# Patient Record
Sex: MALE | Race: ASIAN | NOT HISPANIC OR LATINO | URBAN - METROPOLITAN AREA
[De-identification: names, ages, dates, MRNs, and addresses within clinical notes are randomized per-mention and may not be internally consistent; named-entity substitution may affect disease eponyms.]

---

## 2018-12-30 ENCOUNTER — EMERGENCY (EMERGENCY)
Facility: HOSPITAL | Age: 53
LOS: 1 days | Discharge: HOME | End: 2018-12-30
Attending: EMERGENCY MEDICINE
Payer: MEDICAID

## 2018-12-30 VITALS
RESPIRATION RATE: 17 BRPM | HEIGHT: 68 IN | HEART RATE: 77 BPM | WEIGHT: 205.91 LBS | DIASTOLIC BLOOD PRESSURE: 136 MMHG | OXYGEN SATURATION: 98 % | SYSTOLIC BLOOD PRESSURE: 206 MMHG | TEMPERATURE: 99 F

## 2018-12-30 LAB
ALBUMIN SERPL ELPH-MCNC: 4.2 G/DL — SIGNIFICANT CHANGE UP (ref 3.3–5)
ALP SERPL-CCNC: 110 U/L — SIGNIFICANT CHANGE UP (ref 40–120)
ALT FLD-CCNC: 33 U/L — SIGNIFICANT CHANGE UP (ref 10–45)
ANION GAP SERPL CALC-SCNC: 13 MMOL/L — SIGNIFICANT CHANGE UP (ref 5–17)
APPEARANCE UR: CLEAR — SIGNIFICANT CHANGE UP
APTT BLD: 32.1 SEC — SIGNIFICANT CHANGE UP (ref 27.5–36.3)
AST SERPL-CCNC: 33 U/L — SIGNIFICANT CHANGE UP (ref 10–40)
BILIRUB SERPL-MCNC: 0.7 MG/DL — SIGNIFICANT CHANGE UP (ref 0.2–1.2)
BILIRUB UR-MCNC: NEGATIVE — SIGNIFICANT CHANGE UP
BUN SERPL-MCNC: 14 MG/DL — SIGNIFICANT CHANGE UP (ref 7–23)
CALCIUM SERPL-MCNC: 9.7 MG/DL — SIGNIFICANT CHANGE UP (ref 8.4–10.5)
CHLORIDE SERPL-SCNC: 101 MMOL/L — SIGNIFICANT CHANGE UP (ref 96–108)
CO2 SERPL-SCNC: 25 MMOL/L — SIGNIFICANT CHANGE UP (ref 22–31)
COLOR SPEC: SIGNIFICANT CHANGE UP
CREAT SERPL-MCNC: 0.97 MG/DL — SIGNIFICANT CHANGE UP (ref 0.5–1.3)
DIFF PNL FLD: NEGATIVE — SIGNIFICANT CHANGE UP
GLUCOSE SERPL-MCNC: 188 MG/DL — HIGH (ref 70–99)
GLUCOSE UR QL: NEGATIVE — SIGNIFICANT CHANGE UP
HCT VFR BLD CALC: 43.5 % — SIGNIFICANT CHANGE UP (ref 39–50)
HGB BLD-MCNC: 14.3 G/DL — SIGNIFICANT CHANGE UP (ref 13–17)
INR BLD: 1 RATIO — SIGNIFICANT CHANGE UP (ref 0.88–1.16)
KETONES UR-MCNC: NEGATIVE — SIGNIFICANT CHANGE UP
LEUKOCYTE ESTERASE UR-ACNC: NEGATIVE — SIGNIFICANT CHANGE UP
MCHC RBC-ENTMCNC: 28.1 PG — SIGNIFICANT CHANGE UP (ref 27–34)
MCHC RBC-ENTMCNC: 32.9 GM/DL — SIGNIFICANT CHANGE UP (ref 32–36)
MCV RBC AUTO: 85.1 FL — SIGNIFICANT CHANGE UP (ref 80–100)
NITRITE UR-MCNC: NEGATIVE — SIGNIFICANT CHANGE UP
PH UR: 6.5 — SIGNIFICANT CHANGE UP (ref 5–8)
PLATELET # BLD AUTO: 291 K/UL — SIGNIFICANT CHANGE UP (ref 150–400)
POTASSIUM SERPL-MCNC: 3.6 MMOL/L — SIGNIFICANT CHANGE UP (ref 3.5–5.3)
POTASSIUM SERPL-SCNC: 3.6 MMOL/L — SIGNIFICANT CHANGE UP (ref 3.5–5.3)
PROT SERPL-MCNC: 8.2 G/DL — SIGNIFICANT CHANGE UP (ref 6–8.3)
PROT UR-MCNC: NEGATIVE — SIGNIFICANT CHANGE UP
PROTHROM AB SERPL-ACNC: 11.4 SEC — SIGNIFICANT CHANGE UP (ref 10–12.9)
RBC # BLD: 5.11 M/UL — SIGNIFICANT CHANGE UP (ref 4.2–5.8)
RBC # FLD: 12.7 % — SIGNIFICANT CHANGE UP (ref 10.3–14.5)
SODIUM SERPL-SCNC: 139 MMOL/L — SIGNIFICANT CHANGE UP (ref 135–145)
SP GR SPEC: >1.05 (ref 1.01–1.02)
TROPONIN T, HIGH SENSITIVITY RESULT: 7 NG/L — SIGNIFICANT CHANGE UP (ref 0–51)
TROPONIN T, HIGH SENSITIVITY RESULT: <6 NG/L — SIGNIFICANT CHANGE UP (ref 0–51)
UROBILINOGEN FLD QL: NEGATIVE — SIGNIFICANT CHANGE UP
WBC # BLD: 9.2 K/UL — SIGNIFICANT CHANGE UP (ref 3.8–10.5)
WBC # FLD AUTO: 9.2 K/UL — SIGNIFICANT CHANGE UP (ref 3.8–10.5)

## 2018-12-30 PROCEDURE — 71045 X-RAY EXAM CHEST 1 VIEW: CPT | Mod: 26

## 2018-12-30 PROCEDURE — 93010 ELECTROCARDIOGRAM REPORT: CPT

## 2018-12-30 PROCEDURE — 99218: CPT

## 2018-12-30 PROCEDURE — 74174 CTA ABD&PLVS W/CONTRAST: CPT | Mod: 26

## 2018-12-30 PROCEDURE — 71275 CT ANGIOGRAPHY CHEST: CPT | Mod: 26

## 2018-12-30 RX ORDER — METOPROLOL TARTRATE 50 MG
5 TABLET ORAL ONCE
Qty: 0 | Refills: 0 | Status: DISCONTINUED | OUTPATIENT
Start: 2018-12-30 | End: 2018-12-30

## 2018-12-30 RX ORDER — METOPROLOL TARTRATE 50 MG
5 TABLET ORAL ONCE
Qty: 0 | Refills: 0 | Status: COMPLETED | OUTPATIENT
Start: 2018-12-30 | End: 2018-12-30

## 2018-12-30 RX ORDER — CAPTOPRIL 12.5 MG/1
25 TABLET ORAL DAILY
Qty: 0 | Refills: 0 | Status: DISCONTINUED | OUTPATIENT
Start: 2018-12-30 | End: 2019-01-03

## 2018-12-30 RX ORDER — ASPIRIN/CALCIUM CARB/MAGNESIUM 324 MG
324 TABLET ORAL ONCE
Qty: 0 | Refills: 0 | Status: COMPLETED | OUTPATIENT
Start: 2018-12-30 | End: 2018-12-30

## 2018-12-30 RX ORDER — AMLODIPINE BESYLATE 2.5 MG/1
10 TABLET ORAL ONCE
Qty: 0 | Refills: 0 | Status: COMPLETED | OUTPATIENT
Start: 2018-12-30 | End: 2018-12-30

## 2018-12-30 RX ORDER — METOPROLOL TARTRATE 50 MG
50 TABLET ORAL ONCE
Qty: 0 | Refills: 0 | Status: COMPLETED | OUTPATIENT
Start: 2018-12-30 | End: 2018-12-30

## 2018-12-30 RX ADMIN — Medication 324 MILLIGRAM(S): at 17:08

## 2018-12-30 RX ADMIN — Medication 50 MILLIGRAM(S): at 17:08

## 2018-12-30 RX ADMIN — AMLODIPINE BESYLATE 10 MILLIGRAM(S): 2.5 TABLET ORAL at 17:08

## 2018-12-30 RX ADMIN — Medication 5 MILLIGRAM(S): at 12:42

## 2018-12-30 NOTE — ED ADULT TRIAGE NOTE - CHIEF COMPLAINT QUOTE
chest pain starting last night while traveling on plane. pain is constant, dull located midsternal chest and radiates to the back. PMH HTN. denies dizziness, N/V, numbness or tingling chest pain starting last night while traveling on plane. pain is constant, dull located midsternal chest and radiates to the back with HTN. PMH HTN. denies dizziness, N/V, numbness or tingling

## 2018-12-30 NOTE — ED PROVIDER NOTE - OBJECTIVE STATEMENT
52yo male pt with PMHx of HTN (unable to recall the name of med) c/o left chest pressure radiating to left arm and back since last night. Pt stated he felt the pain the middle of traveling in airplane (4hours ) last night 52yo male pt with PMHx of HTN (unable to recall the name of med) c/o left chest pressure radiating to left arm and back since last night. Pt stated he felt the pain the middle of traveling in airplane last night and it is constant since then. Denies headache, dizziness or sweating. Denies fever, chills or cough/ congestion. Denies SOB/ABD pain or N/V/D. Denies sensory changes or weakness to extremities. No prev. stress test or cardiologist f/u. 52yo male pt with PMHx of HTN (unable to recall the name of med) c/o left chest pressure radiating to left arm and back since last night. Pt stated he felt the pain the middle of traveling (from [facility]) in airplane last night and it is constant since then. Denies headache, dizziness or sweating. Denies fever, chills or cough/ congestion. Denies SOB/ABD pain or N/V/D. Denies sensory changes or weakness to extremities. No prev. stress test or cardiologist f/u.

## 2018-12-30 NOTE — ED CDU PROVIDER INITIAL DAY NOTE - OBJECTIVE STATEMENT
52yo male pt with PMHx of HTN on captopril c/o left chest pressure radiating to left arm and back since last night. Pt stated he felt the pain the middle of traveling (from [facility]) in airplane last night and it is constant but improving since then. Denies headache, dizziness or sweating. Denies fever, chills or cough/ congestion. Denies SOB/ABD pain or N/V/D. Denies sensory changes or weakness to extremities. No prev. stress test or cardiologist f/u. patient denies personal hx of CAD. on ASA 81 mg daily.

## 2018-12-30 NOTE — ED PROVIDER NOTE - MEDICAL DECISION MAKING DETAILS
Sagrario: 53 year old male with left chest pressure to left arm. will get labs, cxr, cta disseiton study, cdu for acs workup.

## 2018-12-30 NOTE — ED ADULT NURSE NOTE - NS ED NURSE DC INFO COMPLEXITY
Returned Demonstration/Patient asked questions/Simple: Patient demonstrates quick and easy understanding/Moderate: Comprehensive teaching/Verbalized Understanding

## 2018-12-30 NOTE — ED ADULT NURSE REASSESSMENT NOTE - NS ED NURSE REASSESS COMMENT FT1
Received pt from JESUS Pastrana, received pt alert and responsive, oriented x4, denies any respiratory distress, SOB, or difficulty breathing. Pt transferred to CDU for L sided chest pain, c/o 3/10 L sided chest pain declined pain medication. Pt denies SOB, diaphoresis, dizziness, lightheadedness, N/V, F/C, palpitations. On telemetry pt is SR hr: 60's.  IV in place, patent and free of signs of infiltration, V/S stable, pt afebrile. Pt educated on unit and unit rules, instructed patient to notify RN of any needed assistance, Pt verbalizes understanding, Call bell placed within reach. Safety maintained. Will continue to monitor. PT pending stress test, aware not to consume caffeine prior to test. Spouse at bedside. Received pt from JESUS Pastrana, received pt alert and responsive, oriented x4, denies any respiratory distress, SOB, or difficulty breathing. Pt transferred to CDU for L sided chest pain, c/o 3/10 L sided chest pain declined pain medication, PA made aware.  Pt denies SOB, diaphoresis, dizziness, lightheadedness, N/V, F/C, palpitations. On telemetry pt is SR hr: 60's.  IV in place, patent and free of signs of infiltration, V/S stable, pt afebrile. Pt educated on unit and unit rules, instructed patient to notify RN of any needed assistance, Pt verbalizes understanding, Call bell placed within reach. Safety maintained. Will continue to monitor. PT pending stress test, aware not to consume caffeine prior to test. Spouse at bedside.

## 2018-12-30 NOTE — ED CDU PROVIDER INITIAL DAY NOTE - MEDICAL DECISION MAKING DETAILS
Sagrario: 53 year old male with chest pain radiating to the arm. will c/w tele monitoring, stress test, reassess.
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
## 2018-12-30 NOTE — ED ADULT NURSE NOTE - CHIEF COMPLAINT QUOTE
chest pain starting last night while traveling on plane. pain is constant, dull located midsternal chest and radiates to the back with HTN. PMH HTN. denies dizziness, N/V, numbness or tingling

## 2018-12-30 NOTE — ED CDU PROVIDER INITIAL DAY NOTE - PROGRESS NOTE DETAILS
CDU NOTE AG MARSH: NAD VSS.  Patient resting comfortably and has no current complaints. no events on tele. awaiting second troponin as it had to go to the core lab. ekg with no acute changes. CDU PROGRESS NOTE AG DELEON: Pt resting comfortably, feeling well without complaint. NAD, VSS. No events on telemetry. c/d/w Cardiology, will continue with plan exercise stress test and monitor on telemetry. CDU PROGRESS NOTE AG DELEON: Received pt at 1900 sign-out. Pt resting in stretcher in NAD. Case/plan reviewed. VSS, /90. Pt aox3, ambulatory around unit with steady gait. S1 S2 noted, RRR, lungs CTA b/l, BS x4 with soft, nontender abdomen. Pt reports having 3/10 chest discomfort. No events on telemetry. Will discuss with cardiology and continue to monitor overnight. CDU PROGRESS NOTE PA ADRIENNE: Pt resting comfortably, feeling well, reports having unchanged discomfort 3/10 anterior chest, decline pain medication. NAD, VSS. No events on telemetry. CDU PROGRESS NOTE AG DELEON: Received pt at 1900 sign-out. Pt resting in stretcher in NAD. Case/plan reviewed. VSS, /90. Pt aox3, ambulatory around unit with steady gait. S1 S2 noted, RRR, lungs CTA b/l, BS x4 with soft, nontender abdomen. Pt reports having 3/10 chest discomfort, declines pain medication. No events on telemetry. Will discuss with cardiology and continue to monitor overnight. CDU PROGRESS NOTE AG DELEON: Pt resting comfortably, NAD, VSS. No events on telemetry. c/d/w Cardiology, will continue with plan exercise stress test and monitor on telemetry.

## 2018-12-31 VITALS
SYSTOLIC BLOOD PRESSURE: 161 MMHG | DIASTOLIC BLOOD PRESSURE: 107 MMHG | RESPIRATION RATE: 18 BRPM | OXYGEN SATURATION: 98 % | HEART RATE: 68 BPM | TEMPERATURE: 99 F

## 2018-12-31 LAB
CHOLEST SERPL-MCNC: 234 MG/DL — HIGH (ref 10–199)
HBA1C BLD-MCNC: 8.2 % — HIGH (ref 4–5.6)
HDLC SERPL-MCNC: 24 MG/DL — LOW
LIPID PNL WITH DIRECT LDL SERPL: 172 MG/DL — HIGH
TOTAL CHOLESTEROL/HDL RATIO MEASUREMENT: 9.8 RATIO — HIGH (ref 3.4–9.6)
TRIGL SERPL-MCNC: 189 MG/DL — HIGH (ref 10–149)
TROPONIN T, HIGH SENSITIVITY RESULT: 13 NG/L — SIGNIFICANT CHANGE UP (ref 0–51)

## 2018-12-31 PROCEDURE — 83036 HEMOGLOBIN GLYCOSYLATED A1C: CPT

## 2018-12-31 PROCEDURE — 74174 CTA ABD&PLVS W/CONTRAST: CPT

## 2018-12-31 PROCEDURE — 96374 THER/PROPH/DIAG INJ IV PUSH: CPT | Mod: XU

## 2018-12-31 PROCEDURE — 85027 COMPLETE CBC AUTOMATED: CPT

## 2018-12-31 PROCEDURE — 99217: CPT

## 2018-12-31 PROCEDURE — 84484 ASSAY OF TROPONIN QUANT: CPT

## 2018-12-31 PROCEDURE — 93005 ELECTROCARDIOGRAM TRACING: CPT | Mod: 76

## 2018-12-31 PROCEDURE — 81003 URINALYSIS AUTO W/O SCOPE: CPT

## 2018-12-31 PROCEDURE — 80061 LIPID PANEL: CPT

## 2018-12-31 PROCEDURE — 85379 FIBRIN DEGRADATION QUANT: CPT

## 2018-12-31 PROCEDURE — 93018 CV STRESS TEST I&R ONLY: CPT

## 2018-12-31 PROCEDURE — 71045 X-RAY EXAM CHEST 1 VIEW: CPT

## 2018-12-31 PROCEDURE — 85610 PROTHROMBIN TIME: CPT

## 2018-12-31 PROCEDURE — 80053 COMPREHEN METABOLIC PANEL: CPT

## 2018-12-31 PROCEDURE — A9500: CPT

## 2018-12-31 PROCEDURE — 93017 CV STRESS TEST TRACING ONLY: CPT

## 2018-12-31 PROCEDURE — 78452 HT MUSCLE IMAGE SPECT MULT: CPT

## 2018-12-31 PROCEDURE — 71275 CT ANGIOGRAPHY CHEST: CPT

## 2018-12-31 PROCEDURE — 99284 EMERGENCY DEPT VISIT MOD MDM: CPT | Mod: 25

## 2018-12-31 PROCEDURE — 78452 HT MUSCLE IMAGE SPECT MULT: CPT | Mod: 26

## 2018-12-31 PROCEDURE — G0378: CPT

## 2018-12-31 PROCEDURE — 93016 CV STRESS TEST SUPVJ ONLY: CPT

## 2018-12-31 PROCEDURE — 85730 THROMBOPLASTIN TIME PARTIAL: CPT

## 2018-12-31 RX ORDER — ACETAMINOPHEN 500 MG
975 TABLET ORAL ONCE
Qty: 0 | Refills: 0 | Status: COMPLETED | OUTPATIENT
Start: 2018-12-31 | End: 2018-12-31

## 2018-12-31 RX ORDER — AMLODIPINE BESYLATE 2.5 MG/1
1 TABLET ORAL
Qty: 7 | Refills: 0 | OUTPATIENT
Start: 2018-12-31 | End: 2019-01-06

## 2018-12-31 RX ORDER — AMLODIPINE BESYLATE 2.5 MG/1
10 TABLET ORAL ONCE
Qty: 0 | Refills: 0 | Status: COMPLETED | OUTPATIENT
Start: 2018-12-31 | End: 2018-12-31

## 2018-12-31 RX ADMIN — Medication 975 MILLIGRAM(S): at 11:03

## 2018-12-31 RX ADMIN — CAPTOPRIL 25 MILLIGRAM(S): 12.5 TABLET ORAL at 05:04

## 2018-12-31 RX ADMIN — AMLODIPINE BESYLATE 10 MILLIGRAM(S): 2.5 TABLET ORAL at 16:47

## 2018-12-31 NOTE — ED CDU PROVIDER DISPOSITION NOTE - PLAN OF CARE
1. Follow up with your PMD within 48-72 hours.   You may schedule appointment this week with Cardiology clinic by calling [phone].  2. Show copies of your reports given to you. Recommend Aspirin 81mg over the counter daily until further evaluation.  Take all of your other medications as previously prescribed.   3. Worsening or continued chest pain, shortness of breath, weakness, return to ED. 1. Stay hydrated. Encourage low carb/sugar/salt diet. Encourage exercise.   2. Continue Current Home Medications  3. Follow up with your PCP or Medicine clinic [phone] in 1-2 days and Cardiology #[phone] in 2-3 days. Please follow up elevated blood pressures. Please also follow up high HgbA1c (elevated sugars) and high cholesterol. (Bring printed results to your doctor visit).  4. Return if symptoms, worsen, fever, weakness, chest pain, difficulty breathing, dizziness and all other concerns.

## 2018-12-31 NOTE — ED CDU PROVIDER SUBSEQUENT DAY NOTE - PROGRESS NOTE DETAILS
CDU PROGRESS NOTE AG DELEON: Pt resting comfortably, feeling well, reports 1/10 anterior chest pain, non exertional. NAD, /97, No events on telemetry. CDU NOTE AG Ramírez: pt resting comfortably, feels well without complaint. cp improved. NAD recent VSS. no events on tele. CDU NOTE AG Ramírez: pt resting comfortably, feels well without complaint. NAD VSS. no events on tele. CDU NOTE AG Ramírez: pt resting comfortably, feels well without complaint. NAD VSS. no events on tele.  stress test- no areas of ischemia.   discussed with Dr. Crane, pt stable for d/c tof /up with Cardiology within 2-3 days. pt will get rapid f/up sheet. pt feeling better.  asymptomatic.  pt had chest pain during NucStressTest briefly, but resolved during recovery and reading "probably normal"  pt with cont elevation BP and elevated HA1C - pt to be given RC for BP and advised to f/u with cardiology and IM clinic within the next week.  Pt stable for D/C.

## 2018-12-31 NOTE — ED ADULT NURSE REASSESSMENT NOTE - COMFORT CARE
po fluids offered/repositioned/plan of care explained/darkened lights/warm blanket provided
ambulated to bathroom/plan of care explained/po fluids offered

## 2018-12-31 NOTE — ED CDU PROVIDER DISPOSITION NOTE - NSFOLLOWUPINSTRUCTIONS_ED_ALL_ED_FT
1. Stay hydrated. Encourage low carb/sugar/salt diet. Encourage exercise.   2. Continue Current Home Medications  3. Follow up with your PCP or Medicine clinic [phone] in 1-2 days and Cardiology #[phone] in 2-3 days. Please follow up elevated blood pressures. Please also follow up high HgbA1c (elevated sugars) and high cholesterol. (Bring printed results to your doctor visit).  4. Return if symptoms, worsen, fever, weakness, chest pain, difficulty breathing, dizziness and all other concerns. 1. Stay hydrated. Encourage low carb/sugar/salt diet. Encourage exercise.   2. Continue Current Home Medications. Start Amlodipine 10mg daily. Start Aspirin 81mg daily.   3. Follow up with your PCP or Medicine clinic [phone] in 1-2 days and Cardiology #[phone] in 2-3 days. Please follow up elevated blood pressures. Please also follow up high HgbA1c (elevated sugars) and high cholesterol. (Bring printed results to your doctor visit).  4. Return if symptoms, worsen, fever, weakness, chest pain, difficulty breathing, dizziness and all other concerns.

## 2018-12-31 NOTE — ED CDU PROVIDER SUBSEQUENT DAY NOTE - HISTORY
CDU PROGRESS NOTE PA ADRIENNE: No interval change from previous note. Pt resting comfortably, feeling well, NAD, VSS. No events on telemetry.

## 2018-12-31 NOTE — ED ADULT NURSE REASSESSMENT NOTE - GENERAL PATIENT STATE
comfortable appearance/resting/sleeping/cooperative/family/SO at bedside/improvement verbalized/smiling/interactive
comfortable appearance

## 2018-12-31 NOTE — ED CDU PROVIDER DISPOSITION NOTE - ATTENDING CONTRIBUTION TO CARE
Chest pain and elevated BP.  CTA neg for dissection.  NUC STRESS test, cardiac monitor and reassess.

## 2018-12-31 NOTE — ED ADULT NURSE REASSESSMENT NOTE - NS ED NURSE REASSESS COMMENT FT1
07.00 AM Received pt from JESUS YUSUF  Pt is observed  for Chest pain   07.45 Am  pt reassessed , received pt alert and responsive, oriented x4, denies any respiratory distress, SOB, or difficulty breathing, SOB, diaphoresis, dizziness, lightheadedness, N/V, F/C, palpitations. On telemetry pt is SR hr: 60's.  IV in place, patent and free of signs of infiltration, V/S stable, pt afebrile. Pt educated on unit and unit rules, instructed patient to notify RN of any needed assistance, Pt verbalizes understanding, Call bell placed within reach. Safety maintained. Will continue to monitor. PT pending stress test, aware not to consume caffeine prior to test. Spouse at bedside. C/O headache medicated as per order   09.00 Am Pt went for stress test

## 2018-12-31 NOTE — ED CDU PROVIDER DISPOSITION NOTE - CLINICAL COURSE
52yo male pt with PMHx of HTN on captopril c/o left chest pressure radiating to left arm and back since last night. Pt stated he felt the pain the middle of traveling (from [facility]) in airplane last night and it is constant but improving since then. Denies headache, dizziness or sweating. Denies fever, chills or cough/ congestion. Denies SOB/ABD pain or N/V/D. Denies sensory changes or weakness to extremities. No prev. stress test or cardiologist f/u. patient denies personal hx of CAD. on ASA 81 mg daily.  In ED, Patient had ekg showing no signs of acute ischemia, troponin x 2 negative, chest x ray no signs of acute pathology. patient sent to CDU for frequent reeval, vitals q 4hrs, telemetry, Nuclear stress test. 54yo male pt with PMHx of HTN on captopril c/o left chest pressure radiating to left arm and back since last night. Pt stated he felt the pain the middle of traveling (from [facility]) in airplane last night and it is constant but improving since then. Denies headache, dizziness or sweating. Denies fever, chills or cough/ congestion. Denies SOB/ABD pain or N/V/D. Denies sensory changes or weakness to extremities. No prev. stress test or cardiologist f/u. patient denies personal hx of CAD. on ASA 81 mg daily.  In ED, Patient had ekg showing no signs of acute ischemia, troponin x 2 negative, chest x ray no signs of acute pathology. patient sent to CDU for frequent reeval, vitals q 4hrs, telemetry, Nuclear stress test.   nuc stress test- probably normal. no areas of ischemia.   pt to f/up outpt with cardiology.

## 2018-12-31 NOTE — ED ADULT NURSE REASSESSMENT NOTE - NS ED NURSE REASSESS COMMENT FT1
16.30  Pt feeling better . Pt was reviewed by AG Ramírez & Dr solorzano with all the results  Pt is discharged  ML out  AG Dean explained the follow up care  & gave the discharge summary  Pt verbalized the understanding on follow up care .  pt stable to go home pt has stable vitals steady gait A&OX 4  afebrile at the time of Discharge

## 2018-12-31 NOTE — ED ADULT NURSE REASSESSMENT NOTE - NSIMPLEMENTINTERV_GEN_ALL_ED
Implemented All Universal Safety Interventions:  [place] to call system. Call bell, personal items and telephone within reach. Instruct patient to call for assistance. Room bathroom lighting operational. Non-slip footwear when patient is off stretcher. Physically safe environment: no spills, clutter or unnecessary equipment. Stretcher in lowest position, wheels locked, appropriate side rails in place.

## 2019-01-03 ENCOUNTER — OUTPATIENT (OUTPATIENT)
Dept: OUTPATIENT SERVICES | Facility: HOSPITAL | Age: 54
LOS: 1 days | End: 2019-01-03
Payer: SELF-PAY

## 2019-01-03 ENCOUNTER — APPOINTMENT (OUTPATIENT)
Dept: CARDIOLOGY | Facility: HOSPITAL | Age: 54
End: 2019-01-03

## 2019-01-03 ENCOUNTER — NON-APPOINTMENT (OUTPATIENT)
Age: 54
End: 2019-01-03

## 2019-01-03 VITALS
WEIGHT: 220 LBS | DIASTOLIC BLOOD PRESSURE: 82 MMHG | HEIGHT: 70 IN | OXYGEN SATURATION: 98 % | HEART RATE: 109 BPM | BODY MASS INDEX: 31.5 KG/M2 | SYSTOLIC BLOOD PRESSURE: 129 MMHG

## 2019-01-03 DIAGNOSIS — I10 ESSENTIAL (PRIMARY) HYPERTENSION: ICD-10-CM

## 2019-01-03 DIAGNOSIS — E11.9 TYPE 2 DIABETES MELLITUS W/OUT COMPLICATIONS: ICD-10-CM

## 2019-01-03 DIAGNOSIS — E66.3 OVERWEIGHT: ICD-10-CM

## 2019-01-03 DIAGNOSIS — E78.5 HYPERLIPIDEMIA, UNSPECIFIED: ICD-10-CM

## 2019-01-03 DIAGNOSIS — I25.10 ATHEROSCLEROTIC HEART DISEASE OF NATIVE CORONARY ARTERY WITHOUT ANGINA PECTORIS: ICD-10-CM

## 2019-01-03 PROBLEM — Z00.00 ENCOUNTER FOR PREVENTIVE HEALTH EXAMINATION: Status: ACTIVE | Noted: 2019-01-03

## 2019-01-03 PROCEDURE — 93005 ELECTROCARDIOGRAM TRACING: CPT

## 2019-01-03 PROCEDURE — G0463: CPT

## 2019-01-03 RX ORDER — ASPIRIN ENTERIC COATED TABLETS 81 MG 81 MG/1
81 TABLET, DELAYED RELEASE ORAL DAILY
Qty: 30 | Refills: 0 | Status: ACTIVE | COMMUNITY
Start: 2019-01-03 | End: 1900-01-01

## 2019-01-03 RX ORDER — AMLODIPINE BESYLATE 10 MG/1
10 TABLET ORAL DAILY
Qty: 30 | Refills: 0 | Status: ACTIVE | COMMUNITY
Start: 2019-01-03 | End: 1900-01-01

## 2019-01-03 RX ORDER — METFORMIN HYDROCHLORIDE 500 MG/1
500 TABLET, COATED ORAL TWICE DAILY
Qty: 60 | Refills: 0 | Status: ACTIVE | COMMUNITY
Start: 2019-01-03 | End: 1900-01-01

## 2019-01-03 RX ORDER — ATORVASTATIN CALCIUM 40 MG/1
40 TABLET, FILM COATED ORAL DAILY
Qty: 30 | Refills: 0 | Status: ACTIVE | COMMUNITY
Start: 2019-01-03 | End: 1900-01-01

## 2019-01-03 NOTE — PHYSICAL EXAM
[General Appearance - Well Developed] : well developed [Normal Appearance] : normal appearance [General Appearance - Well Nourished] : well nourished [Normal Oral Mucosa] : normal oral mucosa [No Oral Pallor] : no oral pallor [Normal Jugular Venous A Waves Present] : normal jugular venous A waves present [Normal Jugular Venous V Waves Present] : normal jugular venous V waves present [Heart Rate And Rhythm] : heart rate and rhythm were normal [Heart Sounds] : normal S1 and S2 [Murmurs] : no murmurs present [Arterial Pulses Normal] : the arterial pulses were normal [Edema] : no peripheral edema present [] : no respiratory distress [Respiration, Rhythm And Depth] : normal respiratory rhythm and effort [Exaggerated Use Of Accessory Muscles For Inspiration] : no accessory muscle use [Auscultation Breath Sounds / Voice Sounds] : lungs were clear to auscultation bilaterally [Bowel Sounds] : normal bowel sounds [Abdomen Soft] : soft [Abdomen Tenderness] : non-tender [Abnormal Walk] : normal gait [Oriented To Time, Place, And Person] : oriented to person, place, and time [Impaired Insight] : insight and judgment were intact

## 2019-01-03 NOTE — DISCUSSION/SUMMARY
[Hypertension] : hypertension [Stable] : stable [None] : none [Exercise Regimen] : an exercise regimen [Weight Loss] : weight loss [Patient] : the patient [Family] : the patient's family [FreeTextEntry1] : 53M newly diagnosed DM, HTN, HLD who presents for a one-time visit for follow-up from a recent CDU stay for atypical chest pain and negative NST. \par \par HTN - under control with amlodipine. to f/u with PMD re: possible resumption of acei as pt plans to go home to [facility] in the next few days.\par \par HLD\par -start atorvastatin 40mg daily. discussed possible side effects\par \par DM\par -start metformin. discussed increasing as tolerated and possible side effects.\par -increase physical activity\par -increase fruits/vegetables\par -weight loss\par \par Pt to resume primary care at with PMD.

## 2019-01-03 NOTE — HISTORY OF PRESENT ILLNESS
[FreeTextEntry1] : 53M newly diagnosed DM, HTN who presents for follow-up from a recent CDU stay for atypical chest pain. At this visit he was diagnosed with DM. a1c 8.2. NST was wnl.\par \par No recurrent chest pain since then. BP now controlled.

## 2019-01-04 DIAGNOSIS — E11.9 TYPE 2 DIABETES MELLITUS WITHOUT COMPLICATIONS: ICD-10-CM

## 2019-01-04 DIAGNOSIS — E66.3 OVERWEIGHT: ICD-10-CM

## 2019-01-04 DIAGNOSIS — I10 ESSENTIAL (PRIMARY) HYPERTENSION: ICD-10-CM

## 2019-01-04 DIAGNOSIS — E78.5 HYPERLIPIDEMIA, UNSPECIFIED: ICD-10-CM

## 2019-12-05 ENCOUNTER — INPATIENT (INPATIENT)
Facility: HOSPITAL | Age: 54
LOS: 4 days | Discharge: HOME | End: 2019-12-10
Attending: SURGERY | Admitting: SURGERY
Payer: MEDICAID

## 2019-12-05 VITALS
SYSTOLIC BLOOD PRESSURE: 154 MMHG | RESPIRATION RATE: 16 BRPM | OXYGEN SATURATION: 100 % | DIASTOLIC BLOOD PRESSURE: 98 MMHG | TEMPERATURE: 99 F | HEART RATE: 110 BPM

## 2019-12-05 DIAGNOSIS — K85.10 BILIARY ACUTE PANCREATITIS WITHOUT NECROSIS OR INFECTION: ICD-10-CM

## 2019-12-05 DIAGNOSIS — K85.90 ACUTE PANCREATITIS WITHOUT NECROSIS OR INFECTION, UNSPECIFIED: ICD-10-CM

## 2019-12-05 LAB
ALBUMIN SERPL ELPH-MCNC: 3.2 G/DL — LOW (ref 3.3–5)
ALP SERPL-CCNC: 115 U/L — SIGNIFICANT CHANGE UP (ref 40–120)
ALT FLD-CCNC: 70 U/L — HIGH (ref 4–41)
ANION GAP SERPL CALC-SCNC: 13 MMO/L — SIGNIFICANT CHANGE UP (ref 7–14)
APPEARANCE UR: SIGNIFICANT CHANGE UP
AST SERPL-CCNC: 54 U/L — HIGH (ref 4–40)
BACTERIA # UR AUTO: HIGH
BASE EXCESS BLDV CALC-SCNC: 3.4 MMOL/L — SIGNIFICANT CHANGE UP
BASOPHILS # BLD AUTO: 0.1 K/UL — SIGNIFICANT CHANGE UP (ref 0–0.2)
BASOPHILS NFR BLD AUTO: 0.5 % — SIGNIFICANT CHANGE UP (ref 0–2)
BASOPHILS NFR SPEC: 0 % — SIGNIFICANT CHANGE UP (ref 0–2)
BILIRUB SERPL-MCNC: 1.8 MG/DL — HIGH (ref 0.2–1.2)
BILIRUB UR-MCNC: SIGNIFICANT CHANGE UP
BLASTS # FLD: 0 % — SIGNIFICANT CHANGE UP (ref 0–0)
BLOOD GAS VENOUS - CREATININE: 1.01 MG/DL — SIGNIFICANT CHANGE UP (ref 0.5–1.3)
BLOOD GAS VENOUS - FIO2: 21 — SIGNIFICANT CHANGE UP
BLOOD UR QL VISUAL: SIGNIFICANT CHANGE UP
BUN SERPL-MCNC: 13 MG/DL — SIGNIFICANT CHANGE UP (ref 7–23)
CALCIUM SERPL-MCNC: 9.4 MG/DL — SIGNIFICANT CHANGE UP (ref 8.4–10.5)
CHLORIDE BLDV-SCNC: SIGNIFICANT CHANGE UP MMOL/L (ref 96–108)
CHLORIDE SERPL-SCNC: 97 MMOL/L — LOW (ref 98–107)
CO2 SERPL-SCNC: 25 MMOL/L — SIGNIFICANT CHANGE UP (ref 22–31)
COLOR SPEC: SIGNIFICANT CHANGE UP
CREAT SERPL-MCNC: 0.95 MG/DL — SIGNIFICANT CHANGE UP (ref 0.5–1.3)
EOSINOPHIL # BLD AUTO: 0.15 K/UL — SIGNIFICANT CHANGE UP (ref 0–0.5)
EOSINOPHIL NFR BLD AUTO: 0.7 % — SIGNIFICANT CHANGE UP (ref 0–6)
EOSINOPHIL NFR FLD: 0 % — SIGNIFICANT CHANGE UP (ref 0–6)
GAS PNL BLDV: SIGNIFICANT CHANGE UP MMOL/L (ref 136–146)
GIANT PLATELETS BLD QL SMEAR: PRESENT — SIGNIFICANT CHANGE UP
GLUCOSE BLDV-MCNC: 133 MG/DL — HIGH (ref 70–99)
GLUCOSE SERPL-MCNC: 133 MG/DL — HIGH (ref 70–99)
GLUCOSE UR-MCNC: NEGATIVE — SIGNIFICANT CHANGE UP
GRAN CASTS # UR COMP ASSIST: SIGNIFICANT CHANGE UP
HCO3 BLDV-SCNC: 27 MMOL/L — SIGNIFICANT CHANGE UP (ref 20–27)
HCT VFR BLD CALC: 38.5 % — LOW (ref 39–50)
HCT VFR BLDV CALC: 39.1 % — SIGNIFICANT CHANGE UP (ref 39–51)
HGB BLD-MCNC: 12.5 G/DL — LOW (ref 13–17)
HGB BLDV-MCNC: 12.7 G/DL — LOW (ref 13–17)
HYALINE CASTS # UR AUTO: SIGNIFICANT CHANGE UP
IMM GRANULOCYTES NFR BLD AUTO: 1.2 % — SIGNIFICANT CHANGE UP (ref 0–1.5)
KETONES UR-MCNC: HIGH
LACTATE BLDV-MCNC: 1.2 MMOL/L — SIGNIFICANT CHANGE UP (ref 0.5–2)
LEUKOCYTE ESTERASE UR-ACNC: NEGATIVE — SIGNIFICANT CHANGE UP
LIDOCAIN IGE QN: 69 U/L — HIGH (ref 7–60)
LYMPHOCYTES # BLD AUTO: 1.99 K/UL — SIGNIFICANT CHANGE UP (ref 1–3.3)
LYMPHOCYTES # BLD AUTO: 9.7 % — LOW (ref 13–44)
LYMPHOCYTES NFR SPEC AUTO: 1.8 % — LOW (ref 13–44)
MACROCYTES BLD QL: SLIGHT — SIGNIFICANT CHANGE UP
MANUAL SMEAR VERIFICATION: SIGNIFICANT CHANGE UP
MCHC RBC-ENTMCNC: 27.6 PG — SIGNIFICANT CHANGE UP (ref 27–34)
MCHC RBC-ENTMCNC: 32.5 % — SIGNIFICANT CHANGE UP (ref 32–36)
MCV RBC AUTO: 85 FL — SIGNIFICANT CHANGE UP (ref 80–100)
METAMYELOCYTES # FLD: 0 % — SIGNIFICANT CHANGE UP (ref 0–1)
MICROCYTES BLD QL: SIGNIFICANT CHANGE UP
MONOCYTES # BLD AUTO: 2.19 K/UL — HIGH (ref 0–0.9)
MONOCYTES NFR BLD AUTO: 10.7 % — SIGNIFICANT CHANGE UP (ref 2–14)
MONOCYTES NFR BLD: 5.3 % — SIGNIFICANT CHANGE UP (ref 2–9)
MUCOUS THREADS # UR AUTO: SIGNIFICANT CHANGE UP
MYELOCYTES NFR BLD: 0 % — SIGNIFICANT CHANGE UP (ref 0–0)
NEUTROPHIL AB SER-ACNC: 88.4 % — HIGH (ref 43–77)
NEUTROPHILS # BLD AUTO: 15.75 K/UL — HIGH (ref 1.8–7.4)
NEUTROPHILS NFR BLD AUTO: 77.2 % — HIGH (ref 43–77)
NEUTS BAND # BLD: 0 % — SIGNIFICANT CHANGE UP (ref 0–6)
NITRITE UR-MCNC: NEGATIVE — SIGNIFICANT CHANGE UP
NRBC # FLD: 0 K/UL — SIGNIFICANT CHANGE UP (ref 0–0)
OTHER - HEMATOLOGY %: 0 — SIGNIFICANT CHANGE UP
PCO2 BLDV: 43 MMHG — SIGNIFICANT CHANGE UP (ref 41–51)
PH BLDV: 7.42 PH — SIGNIFICANT CHANGE UP (ref 7.32–7.43)
PH UR: 6 — SIGNIFICANT CHANGE UP (ref 5–8)
PLATELET # BLD AUTO: 284 K/UL — SIGNIFICANT CHANGE UP (ref 150–400)
PLATELET COUNT - ESTIMATE: NORMAL — SIGNIFICANT CHANGE UP
PMV BLD: 10.8 FL — SIGNIFICANT CHANGE UP (ref 7–13)
PO2 BLDV: 37 MMHG — SIGNIFICANT CHANGE UP (ref 35–40)
POTASSIUM BLDV-SCNC: SIGNIFICANT CHANGE UP MMOL/L (ref 3.4–4.5)
POTASSIUM SERPL-MCNC: 3.9 MMOL/L — SIGNIFICANT CHANGE UP (ref 3.5–5.3)
POTASSIUM SERPL-SCNC: 3.9 MMOL/L — SIGNIFICANT CHANGE UP (ref 3.5–5.3)
PROMYELOCYTES # FLD: 0 % — SIGNIFICANT CHANGE UP (ref 0–0)
PROT SERPL-MCNC: 7.7 G/DL — SIGNIFICANT CHANGE UP (ref 6–8.3)
PROT UR-MCNC: 100 — HIGH
RBC # BLD: 4.53 M/UL — SIGNIFICANT CHANGE UP (ref 4.2–5.8)
RBC # FLD: 13.5 % — SIGNIFICANT CHANGE UP (ref 10.3–14.5)
RBC CASTS # UR COMP ASSIST: SIGNIFICANT CHANGE UP (ref 0–?)
REVIEW TO FOLLOW: YES — SIGNIFICANT CHANGE UP
SAO2 % BLDV: 68.3 % — SIGNIFICANT CHANGE UP (ref 60–85)
SODIUM SERPL-SCNC: 135 MMOL/L — SIGNIFICANT CHANGE UP (ref 135–145)
SP GR SPEC: 1.03 — SIGNIFICANT CHANGE UP (ref 1–1.04)
SQUAMOUS # UR AUTO: SIGNIFICANT CHANGE UP
UROBILINOGEN FLD QL: HIGH
VARIANT LYMPHS # BLD: 2.7 % — SIGNIFICANT CHANGE UP
WBC # BLD: 20.43 K/UL — HIGH (ref 3.8–10.5)
WBC # FLD AUTO: 20.43 K/UL — HIGH (ref 3.8–10.5)
WBC UR QL: HIGH (ref 0–?)

## 2019-12-05 PROCEDURE — 99221 1ST HOSP IP/OBS SF/LOW 40: CPT | Mod: GC

## 2019-12-05 PROCEDURE — 71046 X-RAY EXAM CHEST 2 VIEWS: CPT | Mod: 26

## 2019-12-05 PROCEDURE — 74177 CT ABD & PELVIS W/CONTRAST: CPT | Mod: 26

## 2019-12-05 RX ORDER — FAMOTIDINE 10 MG/ML
20 INJECTION INTRAVENOUS ONCE
Refills: 0 | Status: COMPLETED | OUTPATIENT
Start: 2019-12-05 | End: 2019-12-05

## 2019-12-05 RX ORDER — INFLUENZA VIRUS VACCINE 15; 15; 15; 15 UG/.5ML; UG/.5ML; UG/.5ML; UG/.5ML
0.5 SUSPENSION INTRAMUSCULAR ONCE
Refills: 0 | Status: DISCONTINUED | OUTPATIENT
Start: 2019-12-05 | End: 2019-12-10

## 2019-12-05 RX ORDER — METFORMIN HYDROCHLORIDE 850 MG/1
1 TABLET ORAL
Qty: 0 | Refills: 0 | DISCHARGE

## 2019-12-05 RX ORDER — MORPHINE SULFATE 50 MG/1
4 CAPSULE, EXTENDED RELEASE ORAL ONCE
Refills: 0 | Status: DISCONTINUED | OUTPATIENT
Start: 2019-12-05 | End: 2019-12-05

## 2019-12-05 RX ORDER — SODIUM CHLORIDE 9 MG/ML
1000 INJECTION INTRAMUSCULAR; INTRAVENOUS; SUBCUTANEOUS ONCE
Refills: 0 | Status: COMPLETED | OUTPATIENT
Start: 2019-12-05 | End: 2019-12-05

## 2019-12-05 RX ORDER — ONDANSETRON 8 MG/1
4 TABLET, FILM COATED ORAL ONCE
Refills: 0 | Status: COMPLETED | OUTPATIENT
Start: 2019-12-05 | End: 2019-12-05

## 2019-12-05 RX ORDER — SODIUM CHLORIDE 9 MG/ML
1000 INJECTION, SOLUTION INTRAVENOUS
Refills: 0 | Status: DISCONTINUED | OUTPATIENT
Start: 2019-12-05 | End: 2019-12-07

## 2019-12-05 RX ORDER — PIPERACILLIN AND TAZOBACTAM 4; .5 G/20ML; G/20ML
3.38 INJECTION, POWDER, LYOPHILIZED, FOR SOLUTION INTRAVENOUS ONCE
Refills: 0 | Status: COMPLETED | OUTPATIENT
Start: 2019-12-05 | End: 2019-12-05

## 2019-12-05 RX ORDER — HEPARIN SODIUM 5000 [USP'U]/ML
5000 INJECTION INTRAVENOUS; SUBCUTANEOUS EVERY 8 HOURS
Refills: 0 | Status: DISCONTINUED | OUTPATIENT
Start: 2019-12-05 | End: 2019-12-10

## 2019-12-05 RX ORDER — SODIUM CHLORIDE 9 MG/ML
2000 INJECTION INTRAMUSCULAR; INTRAVENOUS; SUBCUTANEOUS ONCE
Refills: 0 | Status: COMPLETED | OUTPATIENT
Start: 2019-12-05 | End: 2019-12-05

## 2019-12-05 RX ORDER — PIPERACILLIN AND TAZOBACTAM 4; .5 G/20ML; G/20ML
3.38 INJECTION, POWDER, LYOPHILIZED, FOR SOLUTION INTRAVENOUS EVERY 8 HOURS
Refills: 0 | Status: DISCONTINUED | OUTPATIENT
Start: 2019-12-05 | End: 2019-12-09

## 2019-12-05 RX ADMIN — ONDANSETRON 4 MILLIGRAM(S): 8 TABLET, FILM COATED ORAL at 14:04

## 2019-12-05 RX ADMIN — SODIUM CHLORIDE 2000 MILLILITER(S): 9 INJECTION INTRAMUSCULAR; INTRAVENOUS; SUBCUTANEOUS at 19:08

## 2019-12-05 RX ADMIN — MORPHINE SULFATE 4 MILLIGRAM(S): 50 CAPSULE, EXTENDED RELEASE ORAL at 19:08

## 2019-12-05 RX ADMIN — PIPERACILLIN AND TAZOBACTAM 200 GRAM(S): 4; .5 INJECTION, POWDER, LYOPHILIZED, FOR SOLUTION INTRAVENOUS at 23:35

## 2019-12-05 RX ADMIN — SODIUM CHLORIDE 1000 MILLILITER(S): 9 INJECTION INTRAMUSCULAR; INTRAVENOUS; SUBCUTANEOUS at 15:10

## 2019-12-05 RX ADMIN — FAMOTIDINE 20 MILLIGRAM(S): 10 INJECTION INTRAVENOUS at 14:04

## 2019-12-05 RX ADMIN — SODIUM CHLORIDE 125 MILLILITER(S): 9 INJECTION, SOLUTION INTRAVENOUS at 23:35

## 2019-12-05 RX ADMIN — SODIUM CHLORIDE 1000 MILLILITER(S): 9 INJECTION INTRAMUSCULAR; INTRAVENOUS; SUBCUTANEOUS at 14:05

## 2019-12-05 NOTE — ED PROVIDER NOTE - ATTENDING CONTRIBUTION TO CARE
54M o/w healthy presents with abdominal pain. Was seen for this pain yesterday in [facility] and had an u/s that he was told was negative and a CT scan that showed pancreatitis, so he came to [place] for treatment. No vomiting. No fever. On exam well appearing, nad, mmm, rrr, lungs CTA b/l, abd epigastric and LUQ pain, 2+ pulses, no edema, no rash, alert, speech clear. CT with pancreatitis and gallstones, mild elevation of bili, leukocytosis, lipase mildly elevated. Will admit to surgery.

## 2019-12-05 NOTE — ED ADULT NURSE NOTE - CHIEF COMPLAINT QUOTE
Pt arrived in [place] from [facility] last night.  Pt states that he has been having abd pain for a year, states that is has gotten worse over the past few weeks.  Pt states that the pain feels like a tightness in mid abd.  Pt states that he has an US in [facility] that showed gallstones.  Pt states that he has past medical history of HTN and DM, has not been taking his medications regularly, states that he has not taken them in the last 3 days.

## 2019-12-05 NOTE — H&P ADULT - NSHPSOCIALHISTORY_GEN_ALL_CORE
No smoking, no etoh, no illicits  Lives in [facility] but is currently staying with his daughter in [place].

## 2019-12-05 NOTE — ED PROVIDER NOTE - OBJECTIVE STATEMENT
54 y.o male with a PMhx of DM, HTN non-compliant with medication presents to the ED complaining of having abdominal pain over the past week. Pt states that he recently came from [facility], and states that he was seen by a doctor in [facility] whom performed a ct and sonogram and determined that the pain was caused by gallstones. Pt states that the doctor told him to come to [facility] to get evaluated. Pt states that over the past few days he has lost his appetite, and states that when he attempts to eat the pain worsens. Pt states that he has been drinking fluids, and states that he had a normal bowel movement yesterday. Pt states that the pain is sharp, mostly in the epigastric region. Pt denies having any CP, SOB, DOZIER, headaches, dizziness, fever, chills, bodyaches.

## 2019-12-05 NOTE — ED ADULT TRIAGE NOTE - CHIEF COMPLAINT QUOTE
Pt arrived in [place] from [facility] last night.  Pt states that he has been having abd pain for a year, states that is has gotten worse over the past few weeks.  Pt states that the pain feels like a tightness in mid abd.  Pt states that he has an US in [facility] that showed gallstones.  Pt states that he has past medical history of HTN and DM, has not been taking his medications regularly, states that he has not taken them in the last 3 days.

## 2019-12-05 NOTE — H&P ADULT - PROBLEM SELECTOR PLAN 1
Admit to surgery, Dr. Ahn  NPO  IVF  ABX  Pain control  AM labs  CXR  EKG  Will consider obtaining TTE given questionable history of ACS  Will plan for cholecystectomy after acute pancreatitis resolves.

## 2019-12-05 NOTE — H&P ADULT - NSHPPHYSICALEXAM_GEN_ALL_CORE
AAO in NAD, sitting in chair in ED  No scleral icterus  No respiratory distress  Very soft voice  NSR  Abdomen with epigastric tenderness and RUQ tenderness. No rebound/guarding. No overlying skin changes.

## 2019-12-05 NOTE — H&P ADULT - ATTENDING COMMENTS
I have reviewed the history, interviewed the patient and his son, reviewed pertinent labs and imaging (CT scan), and discussed the care with the consult resident.    The active issues are:  1. gallstone pancreatitis, possible acute calculous cholecystitis    Timing of cholecystectomy to be determined by pancreatitis course.    The Acute Care Surgery (B Team) Attending Group Practice:  Dr. Bebo Ahn, Dr. Jose Saenz, Dr. Morris Verduzco, Dr. Ahmet Joe, Dr. Tim Amado    urgent issues - spectra [id number] or [id number]  nonurgent issues - [phone]  patient appointments or afterhours - [phone]

## 2019-12-05 NOTE — ED PROVIDER NOTE - CLINICAL SUMMARY MEDICAL DECISION MAKING FREE TEXT BOX
54 y.o male with a PMhx of DM, HTN non-compliant with medication presents to the ED complaining of having abdominal pain over the past week. abdominal pain-labs, fluids, medication, ct abd/pelvis, ekg-reassess

## 2019-12-05 NOTE — H&P ADULT - HISTORY OF PRESENT ILLNESS
56M, landed at [facility] this morning from [facility] after being diagnosed with gallstone pancreatitis by his physician. Was told the surgeons at [facility] would be able to address this problem and so he flew here to [place]. Here, he endorses abdominal/epigastric pain that shoots to his back. Although very difficult to elicit a history, this gentleman states that he was told not to eat anything for the past week and was given IV fluids over the past 2 days prior to boarding the airplane. His son who is at the bedside states that the patient's eyes were yellow on Monday (3 days ago) and now appear more normal. Has never had this happen before. He was seen at [facility] cardiology for r/o ACS last year.

## 2019-12-06 LAB
ALBUMIN SERPL ELPH-MCNC: 3 G/DL — LOW (ref 3.3–5)
ALP SERPL-CCNC: 108 U/L — SIGNIFICANT CHANGE UP (ref 40–120)
ALT FLD-CCNC: 59 U/L — HIGH (ref 4–41)
ANION GAP SERPL CALC-SCNC: 13 MMO/L — SIGNIFICANT CHANGE UP (ref 7–14)
APTT BLD: 26.9 SEC — LOW (ref 27.5–36.3)
AST SERPL-CCNC: 44 U/L — HIGH (ref 4–40)
BILIRUB DIRECT SERPL-MCNC: 0.9 MG/DL — HIGH (ref 0.1–0.2)
BILIRUB SERPL-MCNC: 1.6 MG/DL — HIGH (ref 0.2–1.2)
BLD GP AB SCN SERPL QL: NEGATIVE — SIGNIFICANT CHANGE UP
BUN SERPL-MCNC: 10 MG/DL — SIGNIFICANT CHANGE UP (ref 7–23)
CALCIUM SERPL-MCNC: 8.7 MG/DL — SIGNIFICANT CHANGE UP (ref 8.4–10.5)
CHLORIDE SERPL-SCNC: 97 MMOL/L — LOW (ref 98–107)
CO2 SERPL-SCNC: 25 MMOL/L — SIGNIFICANT CHANGE UP (ref 22–31)
CREAT SERPL-MCNC: 0.87 MG/DL — SIGNIFICANT CHANGE UP (ref 0.5–1.3)
GLUCOSE SERPL-MCNC: 127 MG/DL — HIGH (ref 70–99)
HCT VFR BLD CALC: 35.2 % — LOW (ref 39–50)
HGB BLD-MCNC: 11.3 G/DL — LOW (ref 13–17)
INR BLD: 1.18 — HIGH (ref 0.88–1.17)
LIDOCAIN IGE QN: 50.4 U/L — SIGNIFICANT CHANGE UP (ref 7–60)
MCHC RBC-ENTMCNC: 27.2 PG — SIGNIFICANT CHANGE UP (ref 27–34)
MCHC RBC-ENTMCNC: 32.1 % — SIGNIFICANT CHANGE UP (ref 32–36)
MCV RBC AUTO: 84.8 FL — SIGNIFICANT CHANGE UP (ref 80–100)
NRBC # FLD: 0 K/UL — SIGNIFICANT CHANGE UP (ref 0–0)
PLATELET # BLD AUTO: 288 K/UL — SIGNIFICANT CHANGE UP (ref 150–400)
PMV BLD: 10.7 FL — SIGNIFICANT CHANGE UP (ref 7–13)
POTASSIUM SERPL-MCNC: 3.8 MMOL/L — SIGNIFICANT CHANGE UP (ref 3.5–5.3)
POTASSIUM SERPL-SCNC: 3.8 MMOL/L — SIGNIFICANT CHANGE UP (ref 3.5–5.3)
PROT SERPL-MCNC: 7 G/DL — SIGNIFICANT CHANGE UP (ref 6–8.3)
PROTHROM AB SERPL-ACNC: 13.5 SEC — HIGH (ref 9.8–13.1)
RBC # BLD: 4.15 M/UL — LOW (ref 4.2–5.8)
RBC # FLD: 13.9 % — SIGNIFICANT CHANGE UP (ref 10.3–14.5)
RH IG SCN BLD-IMP: POSITIVE — SIGNIFICANT CHANGE UP
SODIUM SERPL-SCNC: 135 MMOL/L — SIGNIFICANT CHANGE UP (ref 135–145)
SPECIMEN SOURCE: SIGNIFICANT CHANGE UP
WBC # BLD: 22.15 K/UL — HIGH (ref 3.8–10.5)
WBC # FLD AUTO: 22.15 K/UL — HIGH (ref 3.8–10.5)

## 2019-12-06 PROCEDURE — 99231 SBSQ HOSP IP/OBS SF/LOW 25: CPT | Mod: 57,GC

## 2019-12-06 RX ORDER — ACETAMINOPHEN 500 MG
1000 TABLET ORAL ONCE
Refills: 0 | Status: COMPLETED | OUTPATIENT
Start: 2019-12-06 | End: 2019-12-06

## 2019-12-06 RX ADMIN — PIPERACILLIN AND TAZOBACTAM 25 GRAM(S): 4; .5 INJECTION, POWDER, LYOPHILIZED, FOR SOLUTION INTRAVENOUS at 06:28

## 2019-12-06 RX ADMIN — SODIUM CHLORIDE 150 MILLILITER(S): 9 INJECTION, SOLUTION INTRAVENOUS at 17:26

## 2019-12-06 RX ADMIN — HEPARIN SODIUM 5000 UNIT(S): 5000 INJECTION INTRAVENOUS; SUBCUTANEOUS at 06:28

## 2019-12-06 RX ADMIN — PIPERACILLIN AND TAZOBACTAM 25 GRAM(S): 4; .5 INJECTION, POWDER, LYOPHILIZED, FOR SOLUTION INTRAVENOUS at 14:01

## 2019-12-06 RX ADMIN — PIPERACILLIN AND TAZOBACTAM 25 GRAM(S): 4; .5 INJECTION, POWDER, LYOPHILIZED, FOR SOLUTION INTRAVENOUS at 21:32

## 2019-12-06 RX ADMIN — Medication 400 MILLIGRAM(S): at 08:34

## 2019-12-06 RX ADMIN — HEPARIN SODIUM 5000 UNIT(S): 5000 INJECTION INTRAVENOUS; SUBCUTANEOUS at 21:31

## 2019-12-06 RX ADMIN — Medication 1000 MILLIGRAM(S): at 08:50

## 2019-12-06 RX ADMIN — SODIUM CHLORIDE 125 MILLILITER(S): 9 INJECTION, SOLUTION INTRAVENOUS at 06:29

## 2019-12-06 NOTE — PHYSICAL THERAPY INITIAL EVALUATION ADULT - CRITERIA FOR SKILLED THERAPEUTIC INTERVENTIONS
anticipated discharge recommendation/rehab potential/impairments found/therapy frequency/predicted duration of therapy intervention

## 2019-12-06 NOTE — PROVIDER CONTACT NOTE (OTHER) - ASSESSMENT
pts vitals stable no chest pain or shortness of breath last fingerstick in ED, no order in place for fingerstick

## 2019-12-06 NOTE — PHYSICAL THERAPY INITIAL EVALUATION ADULT - ADDITIONAL COMMENTS
Pt. was left seated at edge of bed post PT Evaluation, no apparent distress, all lines intact, son present.

## 2019-12-06 NOTE — PROGRESS NOTE ADULT - SUBJECTIVE AND OBJECTIVE BOX
B Team Surgery Progress Note    Subjective: admitted overnight for management of gallstone pancreatitis, currently comfortable and kept NPO    Exam:    Neuro: Alert  GA: well-appearing  Pulm: breathing comfortably  GI: non-distended, soft, ttp in epigastrium and RUQ, no peritoneal signs    Vital Signs Last 24 Hrs  T(C): 36.9 (05 Dec 2019 23:16), Max: 37.2 (05 Dec 2019 16:35)  T(F): 98.4 (05 Dec 2019 23:16), Max: 99 (05 Dec 2019 16:35)  HR: 108 (05 Dec 2019 23:16) (68 - 110)  BP: 157/87 (05 Dec 2019 23:16) (152/93 - 157/87)  BP(mean): --  RR: 14 (05 Dec 2019 23:16) (14 - 17)  SpO2: 97% (05 Dec 2019 23:16) (97% - 100%)    I&O's Detail  MEDICATIONS  (STANDING):  heparin  Injectable 5000 Unit(s) SubCutaneous every 8 hours  influenza   Vaccine 0.5 milliLiter(s) IntraMuscular once  lactated ringers. 1000 milliLiter(s) (125 mL/Hr) IV Continuous <Continuous>  piperacillin/tazobactam IVPB.. 3.375 Gram(s) IV Intermittent every 8 hours    MEDICATIONS  (PRN):      LABS:                        12.5   20.43 )-----------( 284      ( 05 Dec 2019 13:53 )             38.5     12-    135  |  97<L>  |  13  ----------------------------<  133<H>  3.9   |  25  |  0.95    Ca    9.4      05 Dec 2019 13:53    TPro  7.7  /  Alb  3.2<L>  /  TBili  1.8<H>  /  DBili  x   /  AST  54<H>  /  ALT  70<H>  /  AlkPhos  115  12-05      LIVER FUNCTIONS - ( 05 Dec 2019 13:53 )  Alb: 3.2 g/dL / Pro: 7.7 g/dL / ALK PHOS: 115 u/L / ALT: 70 u/L / AST: 54 u/L / GGT: x           Urinalysis Basic - ( 05 Dec 2019 13:53 )    Color: DARK YELLOW / Appearance: Lt TURBID / S.031 / pH: 6.0  Gluc: NEGATIVE / Ketone: MODERATE  / Bili: TRACE / Urobili: MODERATE   Blood: TRACE / Protein: 100 / Nitrite: NEGATIVE   Leuk Esterase: NEGATIVE / RBC: 3-5 / WBC 6-10   Sq Epi: FEW / Non Sq Epi: x / Bacteria: MODERATE

## 2019-12-06 NOTE — PHYSICAL THERAPY INITIAL EVALUATION ADULT - GENERAL OBSERVATIONS, REHAB EVAL
Consult received, chart reviewed. Patient received supine in bed, no apparent distress, son present. Patient agreed to Evaluation from Physical Therapist.

## 2019-12-06 NOTE — PHYSICAL THERAPY INITIAL EVALUATION ADULT - LIVES WITH, PROFILE
Pt. is from [facility]. Currently staying with his daughter in a house. Has steps to negotiate.

## 2019-12-06 NOTE — PROGRESS NOTE ADULT - ASSESSMENT
54M with gallstone pancreatitis, hemodynamically stable 	      - NPO  - IVF  - continue abx  - Pain control as needed  - monitor exam, labs, vitals  - Will plan for cholecystectomy after acute pancreatitis resolves    #[id number] B team

## 2019-12-07 LAB
ANION GAP SERPL CALC-SCNC: 13 MMO/L — SIGNIFICANT CHANGE UP (ref 7–14)
BACTERIA UR CULT: SIGNIFICANT CHANGE UP
BUN SERPL-MCNC: 9 MG/DL — SIGNIFICANT CHANGE UP (ref 7–23)
CALCIUM SERPL-MCNC: 8.8 MG/DL — SIGNIFICANT CHANGE UP (ref 8.4–10.5)
CHLORIDE SERPL-SCNC: 98 MMOL/L — SIGNIFICANT CHANGE UP (ref 98–107)
CO2 SERPL-SCNC: 25 MMOL/L — SIGNIFICANT CHANGE UP (ref 22–31)
CREAT SERPL-MCNC: 0.76 MG/DL — SIGNIFICANT CHANGE UP (ref 0.5–1.3)
GLUCOSE SERPL-MCNC: 110 MG/DL — HIGH (ref 70–99)
HCT VFR BLD CALC: 36.5 % — LOW (ref 39–50)
HGB BLD-MCNC: 11.6 G/DL — LOW (ref 13–17)
MAGNESIUM SERPL-MCNC: 1.9 MG/DL — SIGNIFICANT CHANGE UP (ref 1.6–2.6)
MCHC RBC-ENTMCNC: 27.4 PG — SIGNIFICANT CHANGE UP (ref 27–34)
MCHC RBC-ENTMCNC: 31.8 % — LOW (ref 32–36)
MCV RBC AUTO: 86.1 FL — SIGNIFICANT CHANGE UP (ref 80–100)
NRBC # FLD: 0 K/UL — SIGNIFICANT CHANGE UP (ref 0–0)
PHOSPHATE SERPL-MCNC: 2.9 MG/DL — SIGNIFICANT CHANGE UP (ref 2.5–4.5)
PLATELET # BLD AUTO: 290 K/UL — SIGNIFICANT CHANGE UP (ref 150–400)
PMV BLD: 11.3 FL — SIGNIFICANT CHANGE UP (ref 7–13)
POTASSIUM SERPL-MCNC: 3.4 MMOL/L — LOW (ref 3.5–5.3)
POTASSIUM SERPL-SCNC: 3.4 MMOL/L — LOW (ref 3.5–5.3)
RBC # BLD: 4.24 M/UL — SIGNIFICANT CHANGE UP (ref 4.2–5.8)
RBC # FLD: 13.3 % — SIGNIFICANT CHANGE UP (ref 10.3–14.5)
SODIUM SERPL-SCNC: 136 MMOL/L — SIGNIFICANT CHANGE UP (ref 135–145)
WBC # BLD: 19.33 K/UL — HIGH (ref 3.8–10.5)
WBC # FLD AUTO: 19.33 K/UL — HIGH (ref 3.8–10.5)

## 2019-12-07 PROCEDURE — 74183 MRI ABD W/O CNTR FLWD CNTR: CPT | Mod: 26

## 2019-12-07 PROCEDURE — 99232 SBSQ HOSP IP/OBS MODERATE 35: CPT | Mod: GC

## 2019-12-07 RX ORDER — POTASSIUM CHLORIDE 20 MEQ
20 PACKET (EA) ORAL
Refills: 0 | Status: COMPLETED | OUTPATIENT
Start: 2019-12-07 | End: 2019-12-07

## 2019-12-07 RX ORDER — SODIUM CHLORIDE 9 MG/ML
1000 INJECTION, SOLUTION INTRAVENOUS
Refills: 0 | Status: DISCONTINUED | OUTPATIENT
Start: 2019-12-07 | End: 2019-12-09

## 2019-12-07 RX ADMIN — SODIUM CHLORIDE 150 MILLILITER(S): 9 INJECTION, SOLUTION INTRAVENOUS at 01:30

## 2019-12-07 RX ADMIN — Medication 20 MILLIEQUIVALENT(S): at 13:10

## 2019-12-07 RX ADMIN — HEPARIN SODIUM 5000 UNIT(S): 5000 INJECTION INTRAVENOUS; SUBCUTANEOUS at 22:33

## 2019-12-07 RX ADMIN — SODIUM CHLORIDE 100 MILLILITER(S): 9 INJECTION, SOLUTION INTRAVENOUS at 22:33

## 2019-12-07 RX ADMIN — PIPERACILLIN AND TAZOBACTAM 25 GRAM(S): 4; .5 INJECTION, POWDER, LYOPHILIZED, FOR SOLUTION INTRAVENOUS at 14:19

## 2019-12-07 RX ADMIN — PIPERACILLIN AND TAZOBACTAM 25 GRAM(S): 4; .5 INJECTION, POWDER, LYOPHILIZED, FOR SOLUTION INTRAVENOUS at 22:33

## 2019-12-07 RX ADMIN — HEPARIN SODIUM 5000 UNIT(S): 5000 INJECTION INTRAVENOUS; SUBCUTANEOUS at 05:57

## 2019-12-07 RX ADMIN — PIPERACILLIN AND TAZOBACTAM 25 GRAM(S): 4; .5 INJECTION, POWDER, LYOPHILIZED, FOR SOLUTION INTRAVENOUS at 05:57

## 2019-12-07 RX ADMIN — Medication 20 MILLIEQUIVALENT(S): at 17:44

## 2019-12-07 RX ADMIN — SODIUM CHLORIDE 100 MILLILITER(S): 9 INJECTION, SOLUTION INTRAVENOUS at 10:01

## 2019-12-07 NOTE — PROGRESS NOTE ADULT - ASSESSMENT
54M with gallstone pancreatitis, hemodynamically stable, continuing to progress with plan for interim cholecystectomy      - CLD  - mIVF  - continue abx for possible UTI, abdominal collection  - Pain control as needed  - monitor exam, labs, vitals  - Will plan for cholecystectomy after acute pancreatitis resolves    #[id number] B team 54M with gallstone pancreatitis, hemodynamically stable, continuing to progress with plan for interim cholecystectomy      - CLD  - mIVF  - f/u MRCP  - continue abx for possible UTI, abdominal collection  - Pain control as needed  - monitor exam, labs, vitals  - Will plan for cholecystectomy after acute pancreatitis resolves    #[id number] B team

## 2019-12-07 NOTE — PROGRESS NOTE ADULT - SUBJECTIVE AND OBJECTIVE BOX
B Team Surgery Progress Note    Subjective: still endorsing minimal discomfort, transitioned to clear liquid diet    Exam:    Neuro: Alert  GA: well-appearing  Pulm: breathing comfortably  GI: non-distended, soft, minimally ttp in epigastrium and RUQ, no peritoneal signs    Vital Signs Last 24 Hrs  T(C): 36.9 (07 Dec 2019 05:53), Max: 37.2 (06 Dec 2019 21:11)  T(F): 98.4 (07 Dec 2019 05:53), Max: 99 (06 Dec 2019 21:11)  HR: 87 (07 Dec 2019 05:53) (87 - 93)  BP: 148/84 (07 Dec 2019 05:53) (133/84 - 157/86)  BP(mean): --  RR: 17 (07 Dec 2019 05:53) (15 - 17)  SpO2: 97% (07 Dec 2019 05:53) (94% - 100%)    I&O's Detail    06 Dec 2019 07:01  -  07 Dec 2019 07:00  --------------------------------------------------------  IN:    IV PiggyBack: 250 mL    lactated ringers.: 750 mL  Total IN: 1000 mL    OUT:    Voided: 850 mL  Total OUT: 850 mL    Total NET: 150 mL      MEDICATIONS  (STANDING):  dextrose 5% + sodium chloride 0.45% 1000 milliLiter(s) (100 mL/Hr) IV Continuous <Continuous>  heparin  Injectable 5000 Unit(s) SubCutaneous every 8 hours  influenza   Vaccine 0.5 milliLiter(s) IntraMuscular once  piperacillin/tazobactam IVPB.. 3.375 Gram(s) IV Intermittent every 8 hours  potassium chloride    Tablet ER 20 milliEquivalent(s) Oral every 2 hours    MEDICATIONS  (PRN):      LABS:                        11.6   19.33 )-----------( 290      ( 07 Dec 2019 06:28 )             36.5     12    136  |  98  |  9   ----------------------------<  110<H>  3.4<L>   |  25  |  0.76    Ca    8.8      07 Dec 2019 06:28  Phos  2.9       Mg     1.9         TPro  7.0  /  Alb  3.0<L>  /  TBili  1.6<H>  /  DBili  0.9<H>  /  AST  44<H>  /  ALT  59<H>  /  AlkPhos  108  12    PT/INR - ( 06 Dec 2019 08:02 )   PT: 13.5 SEC;   INR: 1.18          PTT - ( 06 Dec 2019 08:02 )  PTT:26.9 SEC  LIVER FUNCTIONS - ( 06 Dec 2019 08:02 )  Alb: 3.0 g/dL / Pro: 7.0 g/dL / ALK PHOS: 108 u/L / ALT: 59 u/L / AST: 44 u/L / GGT: x           Urinalysis Basic - ( 05 Dec 2019 13:53 )    Color: DARK YELLOW / Appearance: Lt TURBID / S.031 / pH: 6.0  Gluc: NEGATIVE / Ketone: MODERATE  / Bili: TRACE / Urobili: MODERATE   Blood: TRACE / Protein: 100 / Nitrite: NEGATIVE   Leuk Esterase: NEGATIVE / RBC: 3-5 / WBC 6-10   Sq Epi: FEW / Non Sq Epi: x / Bacteria: MODERATE

## 2019-12-08 ENCOUNTER — TRANSCRIPTION ENCOUNTER (OUTPATIENT)
Age: 54
End: 2019-12-08

## 2019-12-08 LAB
ANION GAP SERPL CALC-SCNC: 12 MMO/L — SIGNIFICANT CHANGE UP (ref 7–14)
BLD GP AB SCN SERPL QL: NEGATIVE — SIGNIFICANT CHANGE UP
BUN SERPL-MCNC: 5 MG/DL — LOW (ref 7–23)
CALCIUM SERPL-MCNC: 9.2 MG/DL — SIGNIFICANT CHANGE UP (ref 8.4–10.5)
CHLORIDE SERPL-SCNC: 99 MMOL/L — SIGNIFICANT CHANGE UP (ref 98–107)
CO2 SERPL-SCNC: 26 MMOL/L — SIGNIFICANT CHANGE UP (ref 22–31)
CREAT SERPL-MCNC: 0.8 MG/DL — SIGNIFICANT CHANGE UP (ref 0.5–1.3)
GLUCOSE SERPL-MCNC: 150 MG/DL — HIGH (ref 70–99)
HCT VFR BLD CALC: 38.2 % — LOW (ref 39–50)
HGB BLD-MCNC: 12.2 G/DL — LOW (ref 13–17)
MAGNESIUM SERPL-MCNC: 1.9 MG/DL — SIGNIFICANT CHANGE UP (ref 1.6–2.6)
MCHC RBC-ENTMCNC: 26.9 PG — LOW (ref 27–34)
MCHC RBC-ENTMCNC: 31.9 % — LOW (ref 32–36)
MCV RBC AUTO: 84.1 FL — SIGNIFICANT CHANGE UP (ref 80–100)
NRBC # FLD: 0 K/UL — SIGNIFICANT CHANGE UP (ref 0–0)
PHOSPHATE SERPL-MCNC: 2.8 MG/DL — SIGNIFICANT CHANGE UP (ref 2.5–4.5)
PLATELET # BLD AUTO: 353 K/UL — SIGNIFICANT CHANGE UP (ref 150–400)
PMV BLD: 10.5 FL — SIGNIFICANT CHANGE UP (ref 7–13)
POTASSIUM SERPL-MCNC: 3.7 MMOL/L — SIGNIFICANT CHANGE UP (ref 3.5–5.3)
POTASSIUM SERPL-SCNC: 3.7 MMOL/L — SIGNIFICANT CHANGE UP (ref 3.5–5.3)
RBC # BLD: 4.54 M/UL — SIGNIFICANT CHANGE UP (ref 4.2–5.8)
RBC # FLD: 13.5 % — SIGNIFICANT CHANGE UP (ref 10.3–14.5)
RH IG SCN BLD-IMP: POSITIVE — SIGNIFICANT CHANGE UP
SODIUM SERPL-SCNC: 137 MMOL/L — SIGNIFICANT CHANGE UP (ref 135–145)
WBC # BLD: 11.53 K/UL — HIGH (ref 3.8–10.5)
WBC # FLD AUTO: 11.53 K/UL — HIGH (ref 3.8–10.5)

## 2019-12-08 PROCEDURE — 93306 TTE W/DOPPLER COMPLETE: CPT | Mod: 26

## 2019-12-08 RX ORDER — SODIUM,POTASSIUM PHOSPHATES 278-250MG
1 POWDER IN PACKET (EA) ORAL ONCE
Refills: 0 | Status: COMPLETED | OUTPATIENT
Start: 2019-12-08 | End: 2019-12-08

## 2019-12-08 RX ORDER — MAGNESIUM SULFATE 500 MG/ML
2 VIAL (ML) INJECTION ONCE
Refills: 0 | Status: COMPLETED | OUTPATIENT
Start: 2019-12-08 | End: 2019-12-08

## 2019-12-08 RX ORDER — POTASSIUM CHLORIDE 20 MEQ
20 PACKET (EA) ORAL ONCE
Refills: 0 | Status: COMPLETED | OUTPATIENT
Start: 2019-12-08 | End: 2019-12-08

## 2019-12-08 RX ADMIN — HEPARIN SODIUM 5000 UNIT(S): 5000 INJECTION INTRAVENOUS; SUBCUTANEOUS at 22:55

## 2019-12-08 RX ADMIN — SODIUM CHLORIDE 100 MILLILITER(S): 9 INJECTION, SOLUTION INTRAVENOUS at 22:56

## 2019-12-08 RX ADMIN — HEPARIN SODIUM 5000 UNIT(S): 5000 INJECTION INTRAVENOUS; SUBCUTANEOUS at 14:42

## 2019-12-08 RX ADMIN — PIPERACILLIN AND TAZOBACTAM 25 GRAM(S): 4; .5 INJECTION, POWDER, LYOPHILIZED, FOR SOLUTION INTRAVENOUS at 23:02

## 2019-12-08 RX ADMIN — PIPERACILLIN AND TAZOBACTAM 25 GRAM(S): 4; .5 INJECTION, POWDER, LYOPHILIZED, FOR SOLUTION INTRAVENOUS at 06:14

## 2019-12-08 RX ADMIN — Medication 20 MILLIEQUIVALENT(S): at 14:42

## 2019-12-08 RX ADMIN — PIPERACILLIN AND TAZOBACTAM 25 GRAM(S): 4; .5 INJECTION, POWDER, LYOPHILIZED, FOR SOLUTION INTRAVENOUS at 14:42

## 2019-12-08 RX ADMIN — Medication 50 GRAM(S): at 14:43

## 2019-12-08 RX ADMIN — Medication 1 PACKET(S): at 17:59

## 2019-12-08 NOTE — MEDICAL STUDENT PROGRESS NOTE(EDUCATION) - SUBJECTIVE AND OBJECTIVE BOX
55yo M from [facility] h/o DM admitted for gallstone pancreatitis.    OVERNIGHT: no acute events. Pt is tolerating clears well, denies n/v. Reports that abdominal pain has completely resolved. Passing stool, flatus. OOB to hallway.    Vital Signs (24 Hrs):  T(C): 36.7 (12-08-19 @ 06:13), Max: 36.7 (12-07-19 @ 22:29)  HR: 75 (12-08-19 @ 06:13) (75 - 90)  BP: 143/92 (12-08-19 @ 06:13) (143/92 - 158/88)  RR: 17 (12-08-19 @ 06:13) (16 - 18)  SpO2: 96% (12-08-19 @ 06:13) (95% - 100%)  Wt(kg): --  Daily     Daily     I&O's Summary    07 Dec 2019 07:01  -  08 Dec 2019 07:00  --------------------------------------------------------  IN: 0 mL / OUT: 1250 mL / NET: -1250 mL      GENERAL: NAD, lying in bed comfortably  HEAD:  Atraumatic, Normocephalic  EYES: EOMI, conjunctiva and sclera clear  ENT: Moist mucous membranes  ABDOMEN: Bowel sounds present; Soft, Nontender, Nondistended.   NERVOUS SYSTEM:  Alert & Oriented X3                              12.2   11.53 )-----------( 353      ( 08 Dec 2019 06:03 )             38.2       12-08    137  |  99  |  5<L>  ----------------------------<  150<H>  3.7   |  26  |  0.80    Ca    9.2      08 Dec 2019 06:03  Phos  2.8     12-08  Mg     1.9     12-08      POCT Blood Glucose.: 95 mg/dL (07 Dec 2019 04:07) Patient Education     Influenza (Adult)    Influenza is also called the flu. It is a viral illness that affects the air passages of your lungs. It is different from the common cold. The flu can easily be passed from one to person to another. It may be spread through the air by coughing and sneezing. Or it can be spread by touching the sick person and then touching your own eyes, nose, or mouth.  The flu starts 1 to 3 days after you are exposed to the flu virus. It may last for 1 to 2 weeks. You usually don’t need to take antibiotics unless you have a complication. This might be an ear or sinus infection or pneumonia.  Symptoms of the flu may be mild or severe. They can include extreme tiredness (wanting to stay in bed all day), chills, fevers, muscle aches, soreness with eye movement, headache, and a dry, hacking cough.  Home care  Follow these guidelines when caring for yourself at home:  · Avoid being around cigarette smoke, whether yours or other people’s.  · Acetaminophen or ibuprofen will help ease your fever, muscle aches, and headache. Don’t give aspirin to anyone younger than 18 who has the flu. Aspirin can harm the liver.  · Nausea and loss of appetite are common with the flu. Eat light meals. Drink 6 to 8 glasses of liquids every day. Good choices are water, sport drinks, soft drinks without caffeine, juices, tea, and soup. Extra fluids will also help loosen secretions in your nose and lungs.  · Over-the-counter cold medicines will not make the flu go away faster. But the medicines may help with coughing, sore throat, and congestion in your nose and sinuses. Don’t use a decongestant if you have high blood pressure.  · Stay home until your fever has been gone for at least 24 hours without using medicine to reduce fever.  Follow-up care  Follow up with your health care provider, or as advised, if you are not getting better over the next week.  If you are 65 or older, talk with your provider about getting a  pneumococcal vaccine every 5 years. You should also get this vaccine if you have chronic asthma or COPD. All adults should get a flu vaccine every fall. Ask your provider about this.  When to seek medical advice  Call your health care provider right away if any of these occur:  · Cough with lots of colored sputum (mucus) or blood in your sputum  · Chest pain, shortness of breath, wheezing, or difficulty breathing  · Severe headache, or face, neck, or ear pain  · New rash with fever  · Fever of 101°F (38°C) oral or higher that doesn’t get better with fever medicine  · Confusion, behavior change, or seizure  · Severe weakness or dizziness  · You get a fever or cough after getting better for a few days     © 8125-6637 [employer]. [address] [place] [address], [place] [address]. All rights reserved. This information is not intended as a substitute for professional medical care. Always follow your healthcare professional's instructions.      Your Patient  today was Josseline. Thank you for letting Josseline assist in registering your visit today!  Your Registered Nurse today was Ninfa HOOD. Thank you for allowing Ninfa GONZALEZ,  Gabbi Lopez, Urgent Care Manager, at [phone]. 

## 2019-12-08 NOTE — PROGRESS NOTE ADULT - SUBJECTIVE AND OBJECTIVE BOX
Surgery Progress Note  Patient is a 54y old  Male who presents with a chief complaint of Gallstone pancreatitis (07 Dec 2019 09:44)      SUBJECTIVE: Patient seen and examined at bedside with surgical team, patient without complaints.    Patient tolerating CLD. OOB. Denies abdominal pain, nausea, and vomiting.     Vital Signs Last 24 Hrs  T(C): 36.7 (08 Dec 2019 06:13), Max: 36.7 (07 Dec 2019 22:29)  T(F): 98.1 (08 Dec 2019 06:13), Max: 98.1 (08 Dec 2019 06:13)  HR: 75 (08 Dec 2019 06:13) (75 - 90)  BP: 143/92 (08 Dec 2019 06:13) (143/92 - 158/88)  BP(mean): --  RR: 17 (08 Dec 2019 06:13) (16 - 18)  SpO2: 96% (08 Dec 2019 06:13) (95% - 100%)    Physical Exam  Constitutional: NAD  Respiratory: breathing comfortably on RA  Abd: soft, nontender, nondistended   Ext: moving all 4 ext spontaneously     I&O's Detail    07 Dec 2019 07:01  -  08 Dec 2019 07:00  --------------------------------------------------------  IN:  Total IN: 0 mL    OUT:    Voided: 1250 mL  Total OUT: 1250 mL    Total NET: -1250 mL      MEDICATIONS  (STANDING):  dextrose 5% + sodium chloride 0.45% 1000 milliLiter(s) (100 mL/Hr) IV Continuous <Continuous>  heparin  Injectable 5000 Unit(s) SubCutaneous every 8 hours  influenza   Vaccine 0.5 milliLiter(s) IntraMuscular once  piperacillin/tazobactam IVPB.. 3.375 Gram(s) IV Intermittent every 8 hours    MEDICATIONS  (PRN):      LABS:                        12.2   11.53 )-----------( 353      ( 08 Dec 2019 06:03 )             38.2     12-08    137  |  99  |  5<L>  ----------------------------<  150<H>  3.7   |  26  |  0.80    Ca    9.2      08 Dec 2019 06:03  Phos  2.8     12-08  Mg     1.9     12-08

## 2019-12-08 NOTE — MEDICAL STUDENT PROGRESS NOTE(EDUCATION) - NS MD HP STUD ASPLAN PLAN FT
1. Gallstone pancreatitis  -Advance diet to regular  -NPO after midnight plan for OR tomorrow  -C/w Zosyn (started 12/5)  -Encouraged OOB, eating    2. Hypertension  -Holding amlodipine as BP measurements have been 140s-150s systolic. Can consider resuming following procedure.

## 2019-12-08 NOTE — PROGRESS NOTE ADULT - ATTENDING COMMENTS
Patient with gallstone pancreatitis, acute cholecystitis, elevated LFTs. IV antibiotics for cholecystitis. Awaiting cooling down of pancreatitis and iv antibiotics for cholecystitis. Rule out choledocholithiasis with MRCP  plan  NPO  IV ABX  MRCP  same admission cholecystectomy    I have personally interviewed and examined this patient, reviewed pertinent labs and imaging, and discussed the case with colleagues, residents, and physician assistants on B Team rounds.    The active care issues are:  1. gallstone pancreatitis  2. acute cholecystitis    The Acute Care Surgery (B Team) Attending Group Practice:  Dr. Bebo Ahn, Dr. Jose Saenz, Dr. Geneva Spencer, Dr. Morris Verduzco, Dr. Ahmet Joe, Dr. Tim Amado    urgent issues - spectra [phone] or [phone]  nonurgent issues - [phone]  patient appointments or afterhours - [phone]
I have personally interviewed and examined this patient, reviewed pertinent labs and imaging, and discussed the case with residents on B Team rounds.    The active care issues are:  1. gallstone pancreatitis, acute cholecystitis    Leukocytosis persists and to my read of MRCP, gallbladder wall has thickened, no choledocholithiasis.  F/u final read of MRCP.  Timing of cholecystectomy to be determined as progression of GB inflammation may increase surgical risk at present.    The Acute Care Surgery (B Team) Attending Group Practice:  Dr. Bebo Ahn, Dr. Jose Saenz, Dr. Morris Verduzco, Dr. Ahmet Joe, Dr. Tim Amado    urgent issues - spectra [phone] or [phone]  nonurgent issues - [phone]  patient appointments or afterhours - [phone]
I have personally interviewed and examined this patient, reviewed pertinent labs and imaging, and discussed the case with residents on B Team rounds.  MRCP no choledocholithiasis.    The active care issues are:  1. gallstone pancreatitis with component of acute cholecystitis-improving    Timing of cholecystectomy tbd.    The Acute Care Surgery (B Team) Attending Group Practice:  Dr. Bebo Ahn, Dr. Jose Saenz, Dr. Morris Verduzco, Dr. Ahmet Joe, Dr. Tim Amado    urgent issues - spectra [phone] or [phone]  nonurgent issues - [phone]  patient appointments or afterhours - [phone]

## 2019-12-08 NOTE — PROGRESS NOTE ADULT - ASSESSMENT
Assessment	  54M with gallstone pancreatitis, hemodynamically stable, continuing to progress with plan for interim cholecystectomy      - CLD  - mIVF  - continue abx for possible UTI, abdominal collection  - Pain control as needed  - monitor exam, labs, vitals  - Will plan for cholecystectomy early this week Assessment	  54M with gallstone pancreatitis, hemodynamically stable, continuing to progress with plan for interim cholecystectomy      - CLD  - mIVF  - continue abx for possible UTI, abdominal collection  - Pain control as needed  - monitor exam, labs, vitals  - Will plan for interval cholecystectomy Assessment	  54M with gallstone pancreatitis, hemodynamically stable, continuing to progress with plan for interim cholecystectomy      - CLD  - mIVF  - continue abx for acute calculous cholecystitis component  - Pain control as needed  - monitor exam, labs, vitals  - Will plan for interval cholecystectomy

## 2019-12-09 ENCOUNTER — RESULT REVIEW (OUTPATIENT)
Age: 54
End: 2019-12-09

## 2019-12-09 LAB
ANION GAP SERPL CALC-SCNC: 9 MMO/L — SIGNIFICANT CHANGE UP (ref 7–14)
APTT BLD: 29.7 SEC — SIGNIFICANT CHANGE UP (ref 27.5–36.3)
BUN SERPL-MCNC: 3 MG/DL — LOW (ref 7–23)
CALCIUM SERPL-MCNC: 8.9 MG/DL — SIGNIFICANT CHANGE UP (ref 8.4–10.5)
CHLORIDE SERPL-SCNC: 100 MMOL/L — SIGNIFICANT CHANGE UP (ref 98–107)
CO2 SERPL-SCNC: 25 MMOL/L — SIGNIFICANT CHANGE UP (ref 22–31)
CREAT SERPL-MCNC: 0.77 MG/DL — SIGNIFICANT CHANGE UP (ref 0.5–1.3)
GLUCOSE SERPL-MCNC: 162 MG/DL — HIGH (ref 70–99)
HCT VFR BLD CALC: 37.9 % — LOW (ref 39–50)
HGB BLD-MCNC: 12.4 G/DL — LOW (ref 13–17)
INR BLD: 1.12 — SIGNIFICANT CHANGE UP (ref 0.88–1.17)
MAGNESIUM SERPL-MCNC: 2.3 MG/DL — SIGNIFICANT CHANGE UP (ref 1.6–2.6)
MCHC RBC-ENTMCNC: 27.6 PG — SIGNIFICANT CHANGE UP (ref 27–34)
MCHC RBC-ENTMCNC: 32.7 % — SIGNIFICANT CHANGE UP (ref 32–36)
MCV RBC AUTO: 84.2 FL — SIGNIFICANT CHANGE UP (ref 80–100)
NRBC # FLD: 0 K/UL — SIGNIFICANT CHANGE UP (ref 0–0)
PHOSPHATE SERPL-MCNC: 3.5 MG/DL — SIGNIFICANT CHANGE UP (ref 2.5–4.5)
PLATELET # BLD AUTO: 438 K/UL — HIGH (ref 150–400)
PMV BLD: 10.3 FL — SIGNIFICANT CHANGE UP (ref 7–13)
POTASSIUM SERPL-MCNC: 4.5 MMOL/L — SIGNIFICANT CHANGE UP (ref 3.5–5.3)
POTASSIUM SERPL-SCNC: 4.5 MMOL/L — SIGNIFICANT CHANGE UP (ref 3.5–5.3)
PROTHROM AB SERPL-ACNC: 12.5 SEC — SIGNIFICANT CHANGE UP (ref 9.8–13.1)
RBC # BLD: 4.5 M/UL — SIGNIFICANT CHANGE UP (ref 4.2–5.8)
RBC # FLD: 13.6 % — SIGNIFICANT CHANGE UP (ref 10.3–14.5)
SODIUM SERPL-SCNC: 134 MMOL/L — LOW (ref 135–145)
WBC # BLD: 9.06 K/UL — SIGNIFICANT CHANGE UP (ref 3.8–10.5)
WBC # FLD AUTO: 9.06 K/UL — SIGNIFICANT CHANGE UP (ref 3.8–10.5)

## 2019-12-09 PROCEDURE — 47562 LAPAROSCOPIC CHOLECYSTECTOMY: CPT

## 2019-12-09 PROCEDURE — 88304 TISSUE EXAM BY PATHOLOGIST: CPT | Mod: 26

## 2019-12-09 RX ORDER — HYDROMORPHONE HYDROCHLORIDE 2 MG/ML
0.5 INJECTION INTRAMUSCULAR; INTRAVENOUS; SUBCUTANEOUS
Refills: 0 | Status: DISCONTINUED | OUTPATIENT
Start: 2019-12-09 | End: 2019-12-09

## 2019-12-09 RX ORDER — METOPROLOL TARTRATE 50 MG
12.5 TABLET ORAL
Refills: 0 | Status: DISCONTINUED | OUTPATIENT
Start: 2019-12-09 | End: 2019-12-10

## 2019-12-09 RX ORDER — LABETALOL HCL 100 MG
10 TABLET ORAL ONCE
Refills: 0 | Status: COMPLETED | OUTPATIENT
Start: 2019-12-09 | End: 2019-12-09

## 2019-12-09 RX ORDER — SODIUM CHLORIDE 9 MG/ML
1000 INJECTION, SOLUTION INTRAVENOUS
Refills: 0 | Status: DISCONTINUED | OUTPATIENT
Start: 2019-12-09 | End: 2019-12-10

## 2019-12-09 RX ORDER — ONDANSETRON 8 MG/1
4 TABLET, FILM COATED ORAL ONCE
Refills: 0 | Status: DISCONTINUED | OUTPATIENT
Start: 2019-12-09 | End: 2019-12-09

## 2019-12-09 RX ORDER — HYDROMORPHONE HYDROCHLORIDE 2 MG/ML
1 INJECTION INTRAMUSCULAR; INTRAVENOUS; SUBCUTANEOUS
Refills: 0 | Status: DISCONTINUED | OUTPATIENT
Start: 2019-12-09 | End: 2019-12-09

## 2019-12-09 RX ADMIN — PIPERACILLIN AND TAZOBACTAM 25 GRAM(S): 4; .5 INJECTION, POWDER, LYOPHILIZED, FOR SOLUTION INTRAVENOUS at 07:02

## 2019-12-09 RX ADMIN — SODIUM CHLORIDE 75 MILLILITER(S): 9 INJECTION, SOLUTION INTRAVENOUS at 17:01

## 2019-12-09 RX ADMIN — HEPARIN SODIUM 5000 UNIT(S): 5000 INJECTION INTRAVENOUS; SUBCUTANEOUS at 05:19

## 2019-12-09 RX ADMIN — Medication 12.5 MILLIGRAM(S): at 21:48

## 2019-12-09 RX ADMIN — Medication 10 MILLIGRAM(S): at 18:22

## 2019-12-09 RX ADMIN — HEPARIN SODIUM 5000 UNIT(S): 5000 INJECTION INTRAVENOUS; SUBCUTANEOUS at 17:33

## 2019-12-09 NOTE — BRIEF OPERATIVE NOTE - OPERATION/FINDINGS
Chronic cholecystitis with surrounding edematous tissue including fat surrounding cystic structures. Top-down dissection performed, evacuation of stones, artery taken with 10mm clips, two endoloops used on cystic duct.

## 2019-12-09 NOTE — CHART NOTE - NSCHARTNOTEFT_GEN_A_CORE
SURGERY POST OP CHECK    Patient seen and examined this evening s/p laparoscopic cholecystectomy. No acute events since surgery. Pain well controlled. Denies N/V/D/F/C. Restarted patient's home dose of metoprolol 12.5 BID. AVSS. Abdominal exam soft, nontender, nondistended, dressing c/d/i. Patient will remain on SQH for DVT ppx, IVF, CLD (advance tomorrow), follow-up AM labs.    Surgery B team  [id number]

## 2019-12-09 NOTE — PROGRESS NOTE ADULT - SUBJECTIVE AND OBJECTIVE BOX
Surgery Progress Note  Patient is a 54y old  Male who presents with a chief complaint of Gallstone pancreatitis (07 Dec 2019 09:44)      SUBJECTIVE: Patient seen and examined at bedside with surgical team, patient without complaints. No acute events overnight. Denies abdominal pain, nausea, and vomiting.     Vital Signs Last 24 Hrs  T(C): 36.6 (12-09-19 @ 05:19), Max: 36.6 (12-08-19 @ 15:14)  HR: 83 (12-09-19 @ 05:19) (72 - 83)  BP: 142/96 (12-09-19 @ 05:19) (141/91 - 155/91)  ABP: --  ABP(mean): --  RR: 17 (12-09-19 @ 05:19) (17 - 18)  SpO2: 93% (12-09-19 @ 05:19) (93% - 97%)  Wt(kg): --  CVP(mm Hg): --      12-08 @ 07:01  -  12-09 @ 07:00  --------------------------------------------------------  IN:    dextrose 5% + sodium chloride 0.45%: 1000 mL    IV PiggyBack: 100 mL  Total IN: 1100 mL    OUT:    Voided: 1400 mL  Total OUT: 1400 mL    Total NET: -300 mL        Physical Exam  Constitutional: NAD  Respiratory: breathing comfortably on RA  Abd: soft, nontender, nondistended   Ext: moving all 4 ext spontaneously       MEDICATIONS  (STANDING):  dextrose 5% + sodium chloride 0.45% 1000 milliLiter(s) (100 mL/Hr) IV Continuous <Continuous>  heparin  Injectable 5000 Unit(s) SubCutaneous every 8 hours  influenza   Vaccine 0.5 milliLiter(s) IntraMuscular once  piperacillin/tazobactam IVPB.. 3.375 Gram(s) IV Intermittent every 8 hours    MEDICATIONS  (PRN):      LABS:    CBC (12-09 @ 06:31)                              12.4<L>                         9.06    )----------------(  438<H>     --    % Neutrophils, --    % Lymphocytes, ANC: --                                  37.9<L>  CBC (12-08 @ 06:03)                              12.2<L>                         11.53<H>  )----------------(  353        --    % Neutrophils, --    % Lymphocytes, ANC: --                                  38.2<L>    BMP (12-09 @ 06:31)             134<L>  |  100     |  3<L>  		Ca++ --      Ca 8.9                ---------------------------------( 162<H>		Mg 2.3                4.5     |  25      |  0.77  			Ph 3.5     BMP (12-08 @ 06:03)             137     |  99      |  5<L>  		Ca++ --      Ca 9.2                ---------------------------------( 150<H>		Mg 1.9                3.7     |  26      |  0.80  			Ph 2.8         Coags (12-09 @ 06:31)  aPTT 29.7 / INR 1.12 / PT 12.5

## 2019-12-09 NOTE — BRIEF OPERATIVE NOTE - NSICDXBRIEFPOSTOP_GEN_ALL_CORE_FT
POST-OP DIAGNOSIS:  Gallstone pancreatitis 09-Dec-2019 16:21:40  Johnson Goddard  Chronic calculous cholecystitis 09-Dec-2019 16:21:36  Johnson Goddard

## 2019-12-09 NOTE — MEDICAL STUDENT PROGRESS NOTE(EDUCATION) - NS MD HP STUD ASPLAN PLAN FT
1. Gallstone pancreatitis (+/- acute cholecystitis) - pt is clinically much improved  -OR later today add on  -NPO today, c/w IVF 100cc/hr  -C/w Zosyn (started 12/5)  -F/u liver enzymes    2. Hypertension  -Holding amlodipine as BP measurements have been 140s-150s systolic. Can consider resuming following procedure.

## 2019-12-09 NOTE — MEDICAL STUDENT PROGRESS NOTE(EDUCATION) - SUBJECTIVE AND OBJECTIVE BOX
53yo M from [facility] h/o DM admitted for gallstone pancreatitis.    OVERNIGHT: No acute events. Pt feels well, without pain, is passing flatus and stools. Denies fevers, chills.    Vital Signs (24 Hrs):  T(C): 36.6 (12-09-19 @ 05:19), Max: 36.6 (12-08-19 @ 15:14)  HR: 83 (12-09-19 @ 05:19) (72 - 83)  BP: 142/96 (12-09-19 @ 05:19) (141/91 - 155/91)  RR: 17 (12-09-19 @ 05:19) (17 - 18)  SpO2: 93% (12-09-19 @ 05:19) (93% - 97%)  Wt(kg): --  Daily     Daily     I&O's Summary    08 Dec 2019 07:01  -  09 Dec 2019 07:00  --------------------------------------------------------  IN: 1100 mL / OUT: 1400 mL / NET: -300 mL    GENERAL: NAD, lying in bed comfortably  ABDOMEN: Bowel sounds present; Soft, Nontender, Nondistended.   EXTREMITIES: Well perfused extremities b/l. No clubbing, cyanosis, or edema  NERVOUS SYSTEM:  Alert & Oriented X3                              12.4   9.06  )-----------( 438      ( 09 Dec 2019 06:31 )             37.9       12-09    134<L>  |  100  |  3<L>  ----------------------------<  162<H>  4.5   |  25  |  0.77    Ca    8.9      09 Dec 2019 06:31  Phos  3.5     12-09  Mg     2.3     12-09                PT/INR - ( 09 Dec 2019 06:31 )   PT: 12.5 SEC;   INR: 1.12          PTT - ( 09 Dec 2019 06:31 )  PTT:29.7 SEC

## 2019-12-09 NOTE — PROGRESS NOTE ADULT - ASSESSMENT
Assessment	  54M with gallstone pancreatitis, hemodynamically stable, continuing to progress with plan for interim cholecystectomy    - NPO for possible cholecystectomy  - mIVF  - continue abx for acute calculous cholecystitis component  - Pain control as needed  - monitor exam, labs, vitals    [id number]  B Team Surgery

## 2019-12-10 ENCOUNTER — TRANSCRIPTION ENCOUNTER (OUTPATIENT)
Age: 54
End: 2019-12-10

## 2019-12-10 VITALS
RESPIRATION RATE: 18 BRPM | DIASTOLIC BLOOD PRESSURE: 99 MMHG | OXYGEN SATURATION: 98 % | HEART RATE: 70 BPM | SYSTOLIC BLOOD PRESSURE: 162 MMHG | TEMPERATURE: 98 F

## 2019-12-10 LAB
ANION GAP SERPL CALC-SCNC: 13 MMO/L — SIGNIFICANT CHANGE UP (ref 7–14)
BUN SERPL-MCNC: 6 MG/DL — LOW (ref 7–23)
CALCIUM SERPL-MCNC: 9.6 MG/DL — SIGNIFICANT CHANGE UP (ref 8.4–10.5)
CHLORIDE SERPL-SCNC: 99 MMOL/L — SIGNIFICANT CHANGE UP (ref 98–107)
CO2 SERPL-SCNC: 23 MMOL/L — SIGNIFICANT CHANGE UP (ref 22–31)
CREAT SERPL-MCNC: 0.85 MG/DL — SIGNIFICANT CHANGE UP (ref 0.5–1.3)
GLUCOSE SERPL-MCNC: 120 MG/DL — HIGH (ref 70–99)
HCT VFR BLD CALC: 39.7 % — SIGNIFICANT CHANGE UP (ref 39–50)
HGB BLD-MCNC: 12.9 G/DL — LOW (ref 13–17)
MAGNESIUM SERPL-MCNC: 2.1 MG/DL — SIGNIFICANT CHANGE UP (ref 1.6–2.6)
MCHC RBC-ENTMCNC: 27.6 PG — SIGNIFICANT CHANGE UP (ref 27–34)
MCHC RBC-ENTMCNC: 32.5 % — SIGNIFICANT CHANGE UP (ref 32–36)
MCV RBC AUTO: 84.8 FL — SIGNIFICANT CHANGE UP (ref 80–100)
NRBC # FLD: 0 K/UL — SIGNIFICANT CHANGE UP (ref 0–0)
PHOSPHATE SERPL-MCNC: 3.5 MG/DL — SIGNIFICANT CHANGE UP (ref 2.5–4.5)
PLATELET # BLD AUTO: 490 K/UL — HIGH (ref 150–400)
PMV BLD: 9.9 FL — SIGNIFICANT CHANGE UP (ref 7–13)
POTASSIUM SERPL-MCNC: 4.1 MMOL/L — SIGNIFICANT CHANGE UP (ref 3.5–5.3)
POTASSIUM SERPL-SCNC: 4.1 MMOL/L — SIGNIFICANT CHANGE UP (ref 3.5–5.3)
RBC # BLD: 4.68 M/UL — SIGNIFICANT CHANGE UP (ref 4.2–5.8)
RBC # FLD: 13.7 % — SIGNIFICANT CHANGE UP (ref 10.3–14.5)
SODIUM SERPL-SCNC: 135 MMOL/L — SIGNIFICANT CHANGE UP (ref 135–145)
WBC # BLD: 15.25 K/UL — HIGH (ref 3.8–10.5)
WBC # FLD AUTO: 15.25 K/UL — HIGH (ref 3.8–10.5)

## 2019-12-10 RX ORDER — IBUPROFEN 200 MG
1 TABLET ORAL
Qty: 12 | Refills: 0
Start: 2019-12-10 | End: 2019-12-12

## 2019-12-10 RX ORDER — SODIUM CHLORIDE 9 MG/ML
3 INJECTION INTRAMUSCULAR; INTRAVENOUS; SUBCUTANEOUS EVERY 8 HOURS
Refills: 0 | Status: DISCONTINUED | OUTPATIENT
Start: 2019-12-10 | End: 2019-12-10

## 2019-12-10 RX ORDER — ACETAMINOPHEN 500 MG
2 TABLET ORAL
Qty: 24 | Refills: 0
Start: 2019-12-10 | End: 2019-12-12

## 2019-12-10 RX ADMIN — Medication 12.5 MILLIGRAM(S): at 07:27

## 2019-12-10 NOTE — DISCHARGE NOTE PROVIDER - NSDCMRMEDTOKEN_GEN_ALL_CORE_FT
amLODIPine 10 mg oral tablet: 1 tab(s) orally once a day   metFORMIN 500 mg oral tablet: 1 tab(s) orally 2 times a day

## 2019-12-10 NOTE — DISCHARGE NOTE PROVIDER - CARE PROVIDER_API CALL
Porfirio Manjarrez)  Surgery  [address] [place] [address]  Phone: [phone]  Fax: [phone]  Follow Up Time: 1 week

## 2019-12-10 NOTE — DISCHARGE NOTE NURSING/CASE MANAGEMENT/SOCIAL WORK - PATIENT PORTAL LINK FT
You can access the FollowMyHealth Patient Portal offered by [facility] by registering at the following website: [email or web address]. By joining [email or web address] FollowMyHealth portal, you will also be able to view your health information using other applications (apps) compatible with our system.

## 2019-12-10 NOTE — PROGRESS NOTE ADULT - SUBJECTIVE AND OBJECTIVE BOX
Surgery Progress Note  Patient is a 54y old  Male who presents with a chief complaint of Gallstone pancreatitis (07 Dec 2019 09:44)      SUBJECTIVE: Patient seen and examined at bedside with surgical team. Pt s/p lap sarahy yesterday. c/o of some pain. Denies  nausea and vomiting.     Vital Signs Last 24 Hrs  T(C): 36.7 (12-10-19 @ 06:58), Max: 37 (12-09-19 @ 16:35)  HR: 77 (12-10-19 @ 06:58) (66 - 89)  BP: 145/98 (12-10-19 @ 06:58) (129/95 - 155/102)  ABP: --  ABP(mean): --  RR: 18 (12-10-19 @ 06:58) (12 - 21)  SpO2: 100% (12-10-19 @ 06:58) (88% - 100%)  Wt(kg): --  CVP(mm Hg): --      12-09 @ 07:01  -  12-10 @ 07:00  --------------------------------------------------------  IN:    lactated ringers.: 150 mL    Oral Fluid: 100 mL  Total IN: 250 mL    OUT:    Voided: 450 mL  Total OUT: 450 mL    Total NET: -200 mL          Physical Exam  Constitutional: NAD  Respiratory: breathing comfortably on RA  Abd: soft, appropriately tender, non distended. Incision c/d/i  Ext: moving all 4 ext spontaneously       MEDICATIONS  (STANDING):  dextrose 5% + sodium chloride 0.45% 1000 milliLiter(s) (100 mL/Hr) IV Continuous <Continuous>  heparin  Injectable 5000 Unit(s) SubCutaneous every 8 hours  influenza   Vaccine 0.5 milliLiter(s) IntraMuscular once  piperacillin/tazobactam IVPB.. 3.375 Gram(s) IV Intermittent every 8 hours    MEDICATIONS  (PRN):      LABS:    CBC (12-10 @ 07:10)                              12.9<L>                         15.25<H>  )----------------(  490<H>     --    % Neutrophils, --    % Lymphocytes, ANC: --                                  39.7    CBC (12-09 @ 06:31)                              12.4<L>                         9.06    )----------------(  438<H>     --    % Neutrophils, --    % Lymphocytes, ANC: --                                  37.9<L>    BMP (12-10 @ 07:10)             135     |  99      |  6<L>  		Ca++ --      Ca 9.6                ---------------------------------( 120<H>		Mg 2.1                4.1     |  23      |  0.85  			Ph 3.5     BMP (12-09 @ 06:31)             134<L>  |  100     |  3<L>  		Ca++ --      Ca 8.9                ---------------------------------( 162<H>		Mg 2.3                4.5     |  25      |  0.77  			Ph 3.5         Coags (12-09 @ 06:31)  aPTT 29.7 / INR 1.12 / PT 12.5

## 2019-12-10 NOTE — PROGRESS NOTE ADULT - ASSESSMENT
Assessment	  54M with gallstone pancreatitis s/p lap cholecystectomy    - advance diet   - IV lock   - Pain control as needed  - DISPO home today    [id number]  B Team Surgery

## 2019-12-10 NOTE — MEDICAL STUDENT PROGRESS NOTE(EDUCATION) - NS MD HP STUD ASPLAN PLAN FT
-Pt tolerating clears well so can advance to regular diet. Reassess in afternoon and if tolerating full diet, can go home later today  -D/c maintenance fluids as patient is having clears  -Zosyn d/c'd 12/9  -C/w metoprolol 12.5mg BID  -DVT ppx: heparin subQ  -Dispo: home later today

## 2019-12-10 NOTE — DISCHARGE NOTE PROVIDER - HOSPITAL COURSE
Serafin Martinez is a 57yo M from [facility] with h/o DM (not on insulin) and HTN, initially presented with abdominal/epigastric pain shooting to his back for 1-2w. He had previously been diagnosed in [facility] with gallstone pancreatitis and was told to come to [facility]. He had been unable to eat for the week prior to admission and had been given IV fluids over 2 days prior to boarding his plane. His son also noted that the patient had one day of yellow eyes though they appeared normal on presentation. The patient was admitted to Surgery under Dr. Ahn and made NPO, given IVF, and started on Zosyn. On 12/7 two days after admission, the patient had improved pain and was transitioned to clear liquid diet.         12/9 pt underwent lap sarahy which found chronic cholecystitis, with surrounding edematous tissue including fat surrounding cystic structures. Top-down dissection was performed along with evacuation of stones. The patient tolerated the procedure well, and postoperatively went to PACU then back to floor. Zosyn was stopped.        12/10 pt was assessed and deemed safe for discharge. Serafin Martinez is a 55yo M from [facility] with h/o DM (not on insulin) and HTN, initially presented with abdominal/epigastric pain shooting to his back for 1-2w. He had previously been diagnosed in [facility] with gallstone pancreatitis and was told to come to [facility]. He had been unable to eat for the week prior to admission and had been given IV fluids over 2 days prior to boarding his plane. His son also noted that the patient had one day of yellow eyes though they appeared normal on presentation. The patient was admitted to Surgery under Dr. Ahn and made NPO, given IVF, and started on Zosyn. On 12/7 two days after admission, the patient had improved pain and was transitioned to clear liquid diet. MRCP showed no evidence of choledocholithiasis or biliary ductal dilatation. Findings were consistent with pancreatitis without necrosis.        12/9 pt underwent lap sarahy which found chronic cholecystitis, with surrounding edematous tissue including fat surrounding cystic structures. Top-down dissection was performed along with evacuation of stones. The patient tolerated the procedure well, and postoperatively went to PACU then back to floor. Zosyn was stopped.        12/10 pt was assessed and deemed safe for discharge. Serafin Martinez is a 55yo M from [facility] with h/o DM (not on insulin) and HTN, initially presented with abdominal/epigastric pain shooting to his back for 1-2w. He had previously been diagnosed in [facility] with gallstone pancreatitis and was told to come to [facility]. He had been unable to eat for the week prior to admission and had been given IV fluids over 2 days prior to boarding his plane. His son also noted that the patient had one day of yellow eyes though they appeared normal on presentation. The patient was admitted to Surgery under Dr. Ahn and made NPO, given IVF, and started on Zosyn. On 12/7 two days after admission, the patient had improved pain and was transitioned to clear liquid diet. MRCP showed no evidence of choledocholithiasis or biliary ductal dilatation. Findings were consistent with pancreatitis without necrosis.        12/9 pt underwent lap sarahy which found chronic cholecystitis, with surrounding edematous tissue including fat surrounding cystic structures. Top-down dissection was performed along with evacuation of stones. The patient tolerated the procedure well, and postoperatively went to PACU then back to floor. Zosyn was stopped.        During hospital course patients diet was slowly advanced as tolerated.  At this time, pt is tolerating a regular diet, ambulating and voiding.  Pt has been deemed stable for discharge at this time by attending.

## 2019-12-10 NOTE — MEDICAL STUDENT PROGRESS NOTE(EDUCATION) - SUBJECTIVE AND OBJECTIVE BOX
53yo M from [facility] h/o DM admitted for gallstone pancreatitis, s/p lap sarahy 12/9.    OVERNIGHT: no acute events. Pt is ambulating, tolerating clears, denies n/v/d. Reports that he has no abdominal pain and is feeling well. He is passing some flatus though has not yet had BM. Yesterday Metoprolol was restarted (home medication).    Vital Signs (24 Hrs):  T(C): 36.7 (12-10-19 @ 06:58), Max: 37 (12-09-19 @ 16:35)  HR: 77 (12-10-19 @ 06:58) (66 - 89)  BP: 145/98 (12-10-19 @ 06:58) (129/95 - 155/102)  RR: 18 (12-10-19 @ 06:58) (12 - 21)  SpO2: 100% (12-10-19 @ 06:58) (88% - 100%)  Wt(kg): --  Daily Height in cm: 185.42 (09 Dec 2019 12:02)    Daily     I&O's Summary    09 Dec 2019 07:01  -  10 Dec 2019 07:00  --------------------------------------------------------  IN: 250 mL / OUT: 450 mL / NET: -200 mL      GENERAL: NAD, lying in bed comfortably  ABDOMEN: Bowel sounds present; Soft, Nontender, Nondistended. 4 lap incisions well healed, dry and without induration/erythema or drainage  EXTREMITIES: Well perfused. No clubbing, cyanosis, or edema  NERVOUS SYSTEM:  Alert & Oriented X3                              12.4   9.06  )-----------( 438      ( 09 Dec 2019 06:31 )             37.9       12-09    134<L>  |  100  |  3<L>  ----------------------------<  162<H>  4.5   |  25  |  0.77    Ca    8.9      09 Dec 2019 06:31  Phos  3.5     12-09  Mg     2.3     12-09                    PT/INR - ( 09 Dec 2019 06:31 )   PT: 12.5 SEC;   INR: 1.12          PTT - ( 09 Dec 2019 06:31 )  PTT:29.7 SEC    Lactate Trend            CAPILLARY BLOOD GLUCOSE      POCT Blood Glucose.: 172 mg/dL (09 Dec 2019 17:55)

## 2019-12-10 NOTE — DISCHARGE NOTE PROVIDER - NSDCCPTREATMENT_GEN_ALL_CORE_FT
PRINCIPAL PROCEDURE  Procedure: MRCP (magnetic resonance cholangiopancreatography)  Findings and Treatment: FINDINGS:  LOWER CHEST: Within normal limits.  LIVER: Within normal limits.   BILE DUCTS: Normal caliber. No evidence of choledocholithiasis.  GALLBLADDER: Dependent T1 shortening in the bladder likely reflect   presence of biliary sludge. Mild gallbladder wall thickening.  SPLEEN: Within normal limits.  PANCREAS: Peripancreatic stranding and signal abnormality, in keeping   with the clinical history of pancreatitis. No evidence of pancreatic   necrosis. No drainable peripancreatic fluid collection. Pancreatic duct   is normal in caliber. Both the pancreatic duct and CBD are seen to the   level of the ampulla.  ADRENALS: Within normal limits.  KIDNEYS/URETERS: Subcentimeter right renal cysts.  VISUALIZED PORTIONS:  BOWEL: Within normal limits.   PERITONEUM: No ascites.  VESSELS: Within normal limits.  RETROPERITONEUM/LYMPH NODES: No lymphadenopathy.    ABDOMINAL WALL: Within normal limits.  BONES: Within normal limits.  IMPRESSION:   No evidence of choledocholithiasis or biliary ductal dilatation.  Findings in keeping with the clinical history of acute pancreatitis. No   evidence of pancreatic necrosis or drainable peripancreatic fluid   collection.

## 2019-12-10 NOTE — DISCHARGE NOTE PROVIDER - NSDCCPCAREPLAN_GEN_ALL_CORE_FT
PRINCIPAL DISCHARGE DIAGNOSIS  Diagnosis: Acute pancreatitis, unspecified complication status, unspecified pancreatitis type  Assessment and Plan of Treatment: You came to the hospital with 1-2 weeks of abdominal pain and some nausea, and were found to have pancreatitis (inflammation of the pancreas). For a few days you had nothing to eat and you were given IV fluids to treat this. You also had gallstones in your gallbladder, which were the likely cause of the pancreatitis. On 12/9 you had a laparoscopic cholecystectomy to remove the gallbladder, to prevent this from happening again.   You should keep the incision sites dry for a few days, but otherwise you can resume all normal activities. Do not do any heavy lifting for at least 4 weeks.   If you have similar abdominal pain, nausea, or vomiting, please see your doctor. If the pain is severe, you cannot keep down food or liquids, or you have a fever, you should go to the emergency department. PRINCIPAL DISCHARGE DIAGNOSIS  Diagnosis: Acute pancreatitis, unspecified complication status, unspecified pancreatitis type  Assessment and Plan of Treatment: WOUND CARE:  Please keep incisions clean and dry. Please do not Scrub or rub incisions. Do not use lotion or powder on incisions.   BATHING: You may shower and/or sponge bathe. You may use warm soapy water in the shower and rinse, pat dry.  ACTIVITY: No heavy lifting or straining. Otherwise, you may return to your usual level of physical activity. If you are taking narcotic pain   medication DO NOT drive a car, operate machinery or make important decisions.  DIET: Low fat diet   NOTIFY YOUR SURGEON IF: You have any bleeding that does not stop, any pus draining from your wound(s), increased pain at surgical site, any fever (over 100.4 F) persistent nausea/vomiting, or if your pain is not controlled on your discharge pain medications.  Please follow up with your primary care physician in 1-2 weeks regarding your hospitalization.  Please follow up with your surgeon, Dr. Manjarrez in 1 week. Please call office at [phone] and ask for Quyen who will schedule an appointment for you.

## 2019-12-13 PROBLEM — E11.9 TYPE 2 DIABETES MELLITUS WITHOUT COMPLICATIONS: Chronic | Status: ACTIVE | Noted: 2019-12-05

## 2019-12-19 ENCOUNTER — APPOINTMENT (OUTPATIENT)
Dept: TRAUMA SURGERY | Facility: CLINIC | Age: 54
End: 2019-12-19
Payer: MEDICAID

## 2019-12-19 VITALS
WEIGHT: 225 LBS | SYSTOLIC BLOOD PRESSURE: 166 MMHG | DIASTOLIC BLOOD PRESSURE: 122 MMHG | BODY MASS INDEX: 29.82 KG/M2 | HEIGHT: 73 IN

## 2019-12-19 VITALS — DIASTOLIC BLOOD PRESSURE: 108 MMHG | TEMPERATURE: 98.3 F | SYSTOLIC BLOOD PRESSURE: 171 MMHG | HEART RATE: 99 BPM

## 2019-12-19 DIAGNOSIS — K81.1 CHRONIC CHOLECYSTITIS: ICD-10-CM

## 2019-12-19 PROCEDURE — 99024 POSTOP FOLLOW-UP VISIT: CPT

## 2019-12-19 NOTE — ASSESSMENT
[FreeTextEntry1] : S/P laparoscopic cholecystectomy\par \par Without complaints\par Denies fever, chills. abdominal pain, nausea or vomiting\par With flatus\par \par AOX3\par Anicteric sclera, pink conjunctiva\par Incision clean dry and intact\par \par Pathology pending\par \par Prn follow up\par Will call with path results\par Follow up with primary doctor\par No restrictions on diet/activity

## 2021-02-24 NOTE — PATIENT PROFILE ADULT - NSPROGENOTHERPROVIDER_GEN_A_NUR
From: Katie Uribe  To: VENKATA May - CNP  Sent: 2/23/2021 8:42 PM EST  Subject: Non-Urgent Medical Question    Good evening,    Katie Uribe needs a Covid test order for either 3/5 or 3/6 prior to his Baclofen pump surgery on 3/8. Also, we asked for a refill for Pantoprazole and it was only for once a day on the label with 30 pills. He takes that medication twice a day so we will need another 30 pills. We also need to reschedule his check up scheduled for 3/2 as we will be at [facility] for his Baclofen trial.    I can be reached at [phone] or here. Thank You! none

## 2022-10-18 NOTE — PATIENT PROFILE ADULT - HAVE YOU EXPERIENCED A TRAUMATIC EVENT?
Patients GI provider:  Dr Caba Morning    Number to return call: [phone]    Reason for call: Please send pt's Golytely prep to her [facility]   Pt's procedure is scheduled for 12/27/2022    Scheduled procedure/appointment date if applicable: Procedure: 44/40/8875 no

## 2022-11-23 NOTE — ED CDU PROVIDER SUBSEQUENT DAY NOTE - CPE EDP MUSC NORM
Called patient to confirm arrival time of 9am for FUDs procedure on 11/28/2022.  Gave location and explained to pt that there is no fasting for this procedure. Patient verbalized understanding.   
normal...

## 2023-09-10 NOTE — ED PROVIDER NOTE - NSCAREINITIATED _GEN_ER
metoprolol succinate ER (TOPROL XL) 50 MG 24 hr tablet 90 tablet 2 12/15/2022     Last Office Visit: 4/24/23  Future Office visit:   10/30/23    Refill protocol passed  Nicole Archer RN     Ro Jose(NP)

## 2023-11-28 NOTE — MEDICAL STUDENT PROGRESS NOTE(EDUCATION) - NS MD HP STUD ASPLAN ASSES FT
Thank you for coming in today, it was a pleasure to care for you! Please follow the aftercare instructions given. If there are any signs of infection such as redness, swelling, pain, bleeding, discharge of pus or other substances, or any other concerning symptoms, please let us know. Otherwise, please follow-up with Dr. Jose E Torrez as previously instructed. Thank you.
53yo M from [facility] h/o DM, admitted for gallstone pancreatitis, clinically much improved without pain.
55yo M from [facility] h/o DM, admitted for gallstone pancreatitis, s/p lap sarahy 12/9, now recovering well.
55yo M from [facility] h/o DM, admitted for gallstone pancreatitis, clinically much improved without pain, plan for OR today (?)